# Patient Record
Sex: FEMALE | Race: WHITE | NOT HISPANIC OR LATINO | Employment: PART TIME
[De-identification: names, ages, dates, MRNs, and addresses within clinical notes are randomized per-mention and may not be internally consistent; named-entity substitution may affect disease eponyms.]

---

## 2017-06-24 ENCOUNTER — HEALTH MAINTENANCE LETTER (OUTPATIENT)
Age: 32
End: 2017-06-24

## 2017-12-14 ENCOUNTER — OFFICE VISIT (OUTPATIENT)
Dept: PEDIATRICS | Facility: CLINIC | Age: 32
End: 2017-12-14
Payer: COMMERCIAL

## 2017-12-14 VITALS
BODY MASS INDEX: 22.38 KG/M2 | OXYGEN SATURATION: 97 % | HEIGHT: 69 IN | TEMPERATURE: 97.4 F | HEART RATE: 71 BPM | DIASTOLIC BLOOD PRESSURE: 60 MMHG | SYSTOLIC BLOOD PRESSURE: 100 MMHG | WEIGHT: 151.1 LBS

## 2017-12-14 DIAGNOSIS — Z12.4 SCREENING FOR MALIGNANT NEOPLASM OF CERVIX: ICD-10-CM

## 2017-12-14 DIAGNOSIS — N94.10 DYSPAREUNIA IN FEMALE: ICD-10-CM

## 2017-12-14 DIAGNOSIS — Z00.00 ENCOUNTER FOR ROUTINE ADULT HEALTH EXAMINATION WITHOUT ABNORMAL FINDINGS: Primary | ICD-10-CM

## 2017-12-14 PROCEDURE — 99395 PREV VISIT EST AGE 18-39: CPT | Performed by: NURSE PRACTITIONER

## 2017-12-14 PROCEDURE — G0145 SCR C/V CYTO,THINLAYER,RESCR: HCPCS | Performed by: NURSE PRACTITIONER

## 2017-12-14 PROCEDURE — 87624 HPV HI-RISK TYP POOLED RSLT: CPT | Performed by: NURSE PRACTITIONER

## 2017-12-14 ASSESSMENT — PATIENT HEALTH QUESTIONNAIRE - PHQ9
5. POOR APPETITE OR OVEREATING: SEVERAL DAYS
SUM OF ALL RESPONSES TO PHQ QUESTIONS 1-9: 4

## 2017-12-14 ASSESSMENT — ANXIETY QUESTIONNAIRES
7. FEELING AFRAID AS IF SOMETHING AWFUL MIGHT HAPPEN: NOT AT ALL
2. NOT BEING ABLE TO STOP OR CONTROL WORRYING: SEVERAL DAYS
3. WORRYING TOO MUCH ABOUT DIFFERENT THINGS: SEVERAL DAYS
6. BECOMING EASILY ANNOYED OR IRRITABLE: NOT AT ALL
1. FEELING NERVOUS, ANXIOUS, OR ON EDGE: SEVERAL DAYS
GAD7 TOTAL SCORE: 4
5. BEING SO RESTLESS THAT IT IS HARD TO SIT STILL: NOT AT ALL

## 2017-12-14 NOTE — NURSING NOTE
"Chief Complaint   Patient presents with     Physical     AFE       Initial /60 (BP Location: Right arm, Patient Position: Sitting, Cuff Size: Adult Regular)  Pulse 71  Temp 97.4  F (36.3  C) (Temporal)  Ht 5' 8.75\" (1.746 m)  Wt 151 lb 1.6 oz (68.5 kg)  SpO2 97%  Breastfeeding? Yes  BMI 22.48 kg/m2 Estimated body mass index is 22.48 kg/(m^2) as calculated from the following:    Height as of this encounter: 5' 8.75\" (1.746 m).    Weight as of this encounter: 151 lb 1.6 oz (68.5 kg).  Medication Reconciliation: complete      SARAHI Keating      "

## 2017-12-14 NOTE — PATIENT INSTRUCTIONS
PLAN:   1.  Orders Placed This Encounter   Procedures     US Pelvic Complete w Transvaginal     HPV High Risk Types DNA Cervical     Pap imaged thin layer screen with HPV - recommended age 30 - 65 years (select HPV order below)       2. Patient needs to follow up in if no improvement,or sooner if worsening of symptoms or other symptoms develop.  FURTHER TESTING:       - pelvic ultrasound  Will follow up and/or notify patient of  results via My Chart to determine further need for followup  Follow up office visit in one year for annual health maintenance exam, sooner PRN.    Preventive Health Recommendations  Female Ages 26 - 39  Yearly exam:   See your health care provider every year in order to    Review health changes.     Discuss preventive care.      Review your medicines if you your doctor has prescribed any.    Until age 30: Get a Pap test every three years (more often if you have had an abnormal result).    After age 30: Talk to your doctor about whether you should have a Pap test every 3 years or have a Pap test with HPV screening every 5 years.   You do not need a Pap test if your uterus was removed (hysterectomy) and you have not had cancer.  You should be tested each year for STDs (sexually transmitted diseases), if you're at risk.   Talk to your provider about how often to have your cholesterol checked.  If you are at risk for diabetes, you should have a diabetes test (fasting glucose).  Shots: Get a flu shot each year. Get a tetanus shot every 10 years.   Nutrition:     Eat at least 5 servings of fruits and vegetables each day.    Eat whole-grain bread, whole-wheat pasta and brown rice instead of white grains and rice.    Talk to your provider about Calcium and Vitamin D.     Lifestyle    Exercise at least 150 minutes a week (30 minutes a day, 5 days of the week). This will help you control your weight and prevent disease.    Limit alcohol to one drink per day.    No smoking.     Wear sunscreen to  prevent skin cancer.    See your dentist every six months for an exam and cleaning.  It was a pleasure seeing you today at the [facility]. Thank you for allowing us to care for you today. We truly hope we provided you with the excellent service you deserve. Please let us know if there is anything else we can do for you so we can be sure you are leaving completley satisfied with your care experience.       General information about your clinic   Clinic Hours Lab Hours (Appointments are required)   Mon-Thurs: 7:30 AM - 7 PM Mon-Thurs: 7:30 AM - 7 PM   Fri: 7:30 AM - 5 PM Fri: 7:30 AM - 5 PM        After Hours Nurse Advise & Appts:  [facility] Nurse Advisors: [phone]  [facility] On Call: to make appointments anytime: [phone] On Call Physician: call [phone] and answering service will page the on call physician.        For urgent appointments, please call [phone] and ask for the triage nurse or your care team clinic nurse.  How to contact my care team:  MyChart: [email or web address]   Phone: [phone]   Fax: [phone]       [facility]:   Phone: [phone]  Hours: 8:00 AM - 6:00 PM  Medication Refills:  Call your pharmacy and they will forward the refill to us. Please allow 3 business days for your refills to be completed.       Normal or non-critical lab and imaging results will be communicated to you by MyChart, letter or phone within 7 days.  If you do not hear from us within 10 days, please call the clinic. If you have a critical or abnormal lab result, we will notify you by phone as soon as possible.       We now have PWIC (Pediatric Walk in Care)  Monday-Friday from 7:30-4. Simply walk in and be seen for your urgent needs like cough, fever, rash, diarrhea or vomiting, pink eye, UTI. No appointments needed. Ask one of the team for more information      -Your Care Team:    Dr. Lynette Cruz - Internal Medicine/Pediatrics   Dr. Sharath Ratliff - Family  Medicine  Dr. Samreen Little - Pediatrics  Dr. Peggy Roblero - Pediatrics  Gaye Branch CNP - Family Practice Nurse Practitioner

## 2017-12-14 NOTE — PROGRESS NOTES
SUBJECTIVE:   CC: Conchita Linares is an 32 year old woman who presents for preventive health visit.     Healthy Habits:    Do you get at least three servings of calcium containing foods daily (dairy, green leafy vegetables, etc.)? yes    Amount of exercise or daily activities, outside of work: 3 day(s) per week    Problems taking medications regularly not applicable    Medication side effects: No    Have you had an eye exam in the past two years? no    Do you see a dentist twice per year? yes    Do you have sleep apnea, excessive snoring or daytime drowsiness?no      Today's PHQ-2 Score:   PHQ-2 ( 1999 Pf[employer]) 12/14/2017 4/28/2015   Q1: Little interest or pleasure in doing things 0 0   Q2: Feeling down, depressed or hopeless 0 0   PHQ-2 Score 0 0       Abuse: Current or Past(Physical, Sexual or Emotional)- No  Do you feel safe in your environment - Yes  Social History   Substance Use Topics     Smoking status: Never Smoker     Smokeless tobacco: Never Used     Alcohol use Yes      Comment: rarely     If you drink alcohol do you typically have >3 drinks per day or >7 drinks per week? No                     Reviewed orders with patient.  Reviewed health maintenance and updated orders accordingly - Yes  Labs reviewed in EPIC  Patient Active Problem List   Diagnosis     Contraception     Intermittent asthma     CARDIOVASCULAR SCREENING; LDL GOAL LESS THAN 160     Mild major depression (H)     Attention deficit disorder of adult     Past Surgical History:   Procedure Laterality Date     TONSILLECTOMY      1993       Social History   Substance Use Topics     Smoking status: Never Smoker     Smokeless tobacco: Never Used     Alcohol use Yes      Comment: rarely     Family History   Problem Relation Age of Onset     Hypertension Father      CEREBROVASCULAR DISEASE Father      Congenital Anomalies Father      Depression Father      Cardiovascular Father      Cancer - colorectal Maternal Grandfather      Hypertension  Maternal Grandfather      Cancer - colorectal Paternal Grandmother      Breast Cancer Paternal Grandmother      Depression Maternal Uncle      OSTEOPOROSIS Maternal Grandmother      Asthma No family hx of      C.A.D. No family hx of      DIABETES No family hx of      Prostate Cancer No family hx of      Thyroid Disease No family hx of      Blood Disease No family hx of          Current Outpatient Prescriptions   Medication Sig Dispense Refill     Multiple Vitamin (MULTI-VITAMIN PO) Take  by mouth daily.       amphetamine-dextroamphetamine (ADDERALL) 30 MG tablet Take 1 tablet (30 mg) by mouth 2 times daily (Patient not taking: Reported on 12/14/2017) 60 tablet 0     amphetamine-dextroamphetamine (ADDERALL) 30 MG tablet Take 1 tablet (30 mg) by mouth 2 times daily (Patient not taking: Reported on 12/14/2017) 60 tablet 0     buPROPion (WELLBUTRIN SR) 150 MG 12 hr tablet Take 1 tablet once daily and increase to 1 tablet twice daily after 4 to 7 days (Patient not taking: Reported on 12/14/2017) 180 tablet 2     Allergies   Allergen Reactions     Sulfa Drugs      Rash      Amoxil [Amoxicillin] Rash         Mammogram not appropriate for this patient based on age.    Pertinent mammograms are reviewed under the imaging tab.  History of abnormal Pap smear:   YES - other categories - see link Cervical Cytology Screening Guidelines  PAP / HPV 4/10/2014 3/4/2013   PAP NIL ASC-US(A)     Last 3 Pap and HPV Results:       Reviewed and updated as needed this visit by clinical staffAllergies  Meds         Reviewed and updated as needed this visit by Provider        Past Medical History:   Diagnosis Date     ADD (attention deficit disorder)      Attention deficit disorder of adult 3/18/2013     Depression      Intermittent asthma 10/10/2012     Mild major depression (H) 10/15/2012    Seeing [facility] for ADHD and ADD testing 12/2012. - see release of information. ARIADNE Parks       Past Surgical History:   Procedure  "Laterality Date     TONSILLECTOMY      1993        ROS:  CONSTITUTIONAL:NEGATIVE for fever, chills, change in weight  INTEGUMENTARY/SKIN: NEGATIVE for changing lesion  and non-healing lesion  and POSITIVE for fatty lumps   EYES: NEGATIVE for vision changes or irritation  ENT: NEGATIVE for ear, mouth and throat problems  RESP:NEGATIVE for significant cough or SOB  BREAST: NEGATIVE for masses, tenderness or discharge  CV: NEGATIVE for chest pain, palpitations or peripheral edema  GI: NEGATIVE for nausea, abdominal pain, heartburn, or change in bowel habits   female: no unusual urinary symptoms but has  dyspareunia for several months even before the Mirena   MUSCULOSKELETAL:NEGATIVE for significant arthralgias or myalgia  NEURO: NEGATIVE for weakness, dizziness or paresthesias  ENDOCRINE: NEGATIVE for temperature intolerance, skin/hair changes  HEME/ALLERGY/IMMUNE: NEGATIVE for bleeding problems  PSYCHIATRIC: POSITIVE forconcentration difficulty as is off her Adderal due to breast feeding. Will restart after finishing her breastfeeding  and NEGATIVE forthoughts of hurting someone else and thoughts of self harm       OBJECTIVE:   /60 (BP Location: Right arm, Patient Position: Sitting, Cuff Size: Adult Regular)  Pulse 71  Temp 97.4  F (36.3  C) (Temporal)  Ht 5' 8.75\" (1.746 m)  Wt 151 lb 1.6 oz (68.5 kg)  SpO2 97%  Breastfeeding? Yes  BMI 22.48 kg/m2  EXAM:  GENERAL: healthy, alert and no distress  EYES: Eyes grossly normal to inspection, PERRL and conjunctivae and sclerae normal  HENT: ear canals and TM's normal, nose and mouth without ulcers or lesions  NECK: no adenopathy, no asymmetry, masses, or scars and thyroid normal to palpation  RESP: lungs clear to auscultation - no rales, rhonchi or wheezes  BREAST: normal without masses, tenderness or nipple discharge and no palpable axillary masses or adenopathy  CV: regular rate and rhythm, normal S1 S2, no S3 or S4, no murmur, click or rub, no peripheral " "edema and peripheral pulses strong  ABDOMEN: soft, nontender, no hepatosplenomegaly, no masses and bowel sounds normal   (female): normal female external genitalia, normal urethral meatus, vaginal mucosa pink, moist, well rugated, and normal cervix/adnexa/uterus without masses or discharge  MS: no gross musculoskeletal defects noted, no edema  SKIN: no suspicious lesions or rashes  SKIN: Typical soft, rubbery, mobile subcutaneous mass on right arm  consistent with lipoma.  1 cm.  NEURO: Normal strength and tone, mentation intact and speech normal  PSYCH: mentation appears normal, affect normal/bright  LYMPH: no cervical, supraclavicular, axillary, or inguinal adenopathy    ASSESSMENT/PLAN:   Conchtia was seen today for physical.    Diagnoses and all orders for this visit:    Encounter for routine adult health examination without abnormal findings    Screening for malignant neoplasm of cervix  -     HPV High Risk Types DNA Cervical  -     Pap imaged thin layer screen with HPV - recommended age 30 - 65 years (select HPV order below)    Dyspareunia in female  -     US Pelvic Complete w Transvaginal; Future      PLAN:    Patient needs to follow up in if no improvement,or sooner if worsening of symptoms or other symptoms develop.  FURTHER TESTING:       - pelvic ultrasound  Will follow up and/or notify patient of  results via My Chart to determine further need for followup  Follow up office visit in one year for annual health maintenance exam, sooner PRN.      COUNSELING:   Reviewed preventive health counseling, as reflected in patient instructions  Special attention given to:        Regular exercise       Healthy diet/nutrition       Contraception       Family planning         reports that she has never smoked. She has never used smokeless tobacco.    Estimated body mass index is 22.77 kg/(m^2) as calculated from the following:    Height as of 4/28/15: 5' 9\" (1.753 m).    Weight as of 4/28/15: 154 lb 3.2 oz (69.9 kg). "         Counseling Resources:  ATP IV Guidelines  Pooled Cohorts Equation Calculator  Breast Cancer Risk Calculator  FRAX Risk Assessment  ICSI Preventive Guidelines  Dietary Guidelines for Americans, 2010  USDA's MyPlate  ASA Prophylaxis  Lung CA Screening    MARIAA Richard [facility]

## 2017-12-14 NOTE — MR AVS SNAPSHOT
After Visit Summary   12/14/2017    Conchita Linares    MRN: [id number]           Patient Information     Date Of Birth          1985        Visit Information        Provider Department      12/14/2017 2:30 PM Gaye Branch APRN CNP [facility]        Today's Diagnoses     Dyspareunia in female    -  1    Encounter for routine adult health examination without abnormal findings        Screening for malignant neoplasm of cervix          Care Instructions    PLAN:   1.  Orders Placed This Encounter   Procedures     US Pelvic Complete w Transvaginal     HPV High Risk Types DNA Cervical     Pap imaged thin layer screen with HPV - recommended age 30 - 65 years (select HPV order below)       2. Patient needs to follow up in if no improvement,or sooner if worsening of symptoms or other symptoms develop.  FURTHER TESTING:       - pelvic ultrasound  Will follow up and/or notify patient of  results via My Chart to determine further need for followup  Follow up office visit in one year for annual health maintenance exam, sooner PRN.    Preventive Health Recommendations  Female Ages 26 - 39  Yearly exam:   See your health care provider every year in order to    Review health changes.     Discuss preventive care.      Review your medicines if you your doctor has prescribed any.    Until age 30: Get a Pap test every three years (more often if you have had an abnormal result).    After age 30: Talk to your doctor about whether you should have a Pap test every 3 years or have a Pap test with HPV screening every 5 years.   You do not need a Pap test if your uterus was removed (hysterectomy) and you have not had cancer.  You should be tested each year for STDs (sexually transmitted diseases), if you're at risk.   Talk to your provider about how often to have your cholesterol checked.  If you are at risk for diabetes, you should have a diabetes test (fasting glucose).  Shots: Get a flu  shot each year. Get a tetanus shot every 10 years.   Nutrition:     Eat at least 5 servings of fruits and vegetables each day.    Eat whole-grain bread, whole-wheat pasta and brown rice instead of white grains and rice.    Talk to your provider about Calcium and Vitamin D.     Lifestyle    Exercise at least 150 minutes a week (30 minutes a day, 5 days of the week). This will help you control your weight and prevent disease.    Limit alcohol to one drink per day.    No smoking.     Wear sunscreen to prevent skin cancer.    See your dentist every six months for an exam and cleaning.  It was a pleasure seeing you today at the [facility]. Thank you for allowing us to care for you today. We truly hope we provided you with the excellent service you deserve. Please let us know if there is anything else we can do for you so we can be sure you are leaving completley satisfied with your care experience.       General information about your clinic   Clinic Hours Lab Hours (Appointments are required)   Mon-Thurs: 7:30 AM - 7 PM Mon-Thurs: 7:30 AM - 7 PM   Fri: 7:30 AM - 5 PM Fri: 7:30 AM - 5 PM        After Hours Nurse Advise & Appts:  [facility] Nurse Advisors: [phone]  [facility] On Call: to make appointments anytime: [phone] On Call Physician: call [phone] and answering service will page the on call physician.        For urgent appointments, please call [phone] and ask for the triage nurse or your care team clinic nurse.  How to contact my care team:  [facility]: [email or web address]   Phone: [phone]   Fax: [phone]       [facility]:   Phone: [phone]  Hours: 8:00 AM - 6:00 PM  Medication Refills:  Call your pharmacy and they will forward the refill to us. Please allow 3 business days for your refills to be completed.       Normal or non-critical lab and imaging results will be communicated to you by MyChart, letter or phone within 7 days.  If you do not  hear from us within 10 days, please call the clinic. If you have a critical or abnormal lab result, we will notify you by phone as soon as possible.       We now have PWIC (Pediatric Walk in Care)  Monday-Friday from 7:30-4. Simply walk in and be seen for your urgent needs like cough, fever, rash, diarrhea or vomiting, pink eye, UTI. No appointments needed. Ask one of the team for more information      -Your Care Team:    Dr. yLnette Cruz - Internal Medicine/Pediatrics   Dr. Sharath Ratliff - Family Medicine  Dr. Samreen Little - Pediatrics  Dr. Peggy Roblero - Pediatrics  Gaye Branch CNP - Family Practice Nurse Practitioner                         Follow-ups after your visit        Future tests that were ordered for you today     Open Future Orders        Priority Expected Expires Ordered    US Pelvic Complete w Transvaginal Routine  12/14/2018 12/14/2017            Who to contact     If you have questions or need follow up information about today's clinic visit or your schedule please contact [facility] directly at [phone].  Normal or non-critical lab and imaging results will be communicated to you by MyChart, letter or phone within 4 business days after the clinic has received the results. If you do not hear from us within 7 days, please contact the clinic through [facility] or phone. If you have a critical or abnormal lab result, we will notify you by phone as soon as possible.  Submit refill requests through [facility] or call your pharmacy and they will forward the refill request to us. Please allow 3 business days for your refill to be completed.          Additional Information About Your Visit        [facility] [employer] Information     [facility] gives you secure access to your electronic health record. If you see a primary care provider, you can also send messages to your care team and make appointments. If you have questions, please call your primary care clinic.  If you do not have a primary care  "provider, please call [phone] and they will assist you.      [employer] is an electronic gateway that provides easy, online access to your medical records. With [employer], you can request a clinic appointment, read your test results, renew a prescription or communicate with your care team.     To access your existing account, please contact your [facility] or call [phone] for assistance.        Care EveryWhere ID     This is your Care EveryWhere ID. This could be used by other organizations to access your [facility] medical records  [id number]        Your Vitals Were     Pulse Temperature Height Pulse Oximetry Breastfeeding? BMI (Body Mass Index)    71 97.4  F (36.3  C) (Temporal) 5' 8.75\" (1.746 m) 97% Yes 22.48 kg/m2       Blood Pressure from Last 3 Encounters:   12/14/17 100/60   04/28/15 100/65   11/10/14 100/70    Weight from Last 3 Encounters:   12/14/17 151 lb 1.6 oz (68.5 kg)   04/28/15 154 lb 3.2 oz (69.9 kg)   11/10/14 153 lb (69.4 kg)              We Performed the Following     HPV High Risk Types DNA Cervical     Pap imaged thin layer screen with HPV - recommended age 30 - 65 years (select HPV order below)        Primary Care Provider Office Phone # Fax #    Gaye Sharma MARIAA Branch [facility] [phone]       [address]  [place] [address]        Equal Access to Services     TRENTON JOEL : Hadii aad ku hadasho Soomaali, waaxda luqadaha, qaybta kaalmada adeegyada, quinten mathew . So [facility] [phone].    ATENCIÓN: Si habla español, tiene a unger disposición servicios gratuitos de asistencia lingüística. Llame al [phone].    We comply with applicable federal civil rights laws and [place] laws. We do not discriminate on the basis of race, color, national origin, age, disability, sex, sexual orientation, or gender identity.            Thank you!     Thank you for choosing [facility]  for your care. " Our goal is always to provide you with excellent care. Hearing back from our patients is one way we can continue to improve our services. Please take a few minutes to complete the written survey that you may receive in the mail after your visit with us. Thank you!             Your Updated Medication List - Protect others around you: Learn how to safely use, store and throw away your medicines at www.disposemymeds.org.          This list is accurate as of: 12/14/17  3:12 PM.  Always use your most recent med list.                   Brand Name Dispense Instructions for use Diagnosis    * amphetamine-dextroamphetamine 30 MG per tablet    ADDERALL    60 tablet    Take 1 tablet (30 mg) by mouth 2 times daily    Attention deficit disorder of adult       * amphetamine-dextroamphetamine 30 MG per tablet    ADDERALL    60 tablet    Take 1 tablet (30 mg) by mouth 2 times daily    Attention deficit disorder of adult       buPROPion 150 MG 12 hr tablet    WELLBUTRIN SR    180 tablet    Take 1 tablet once daily and increase to 1 tablet twice daily after 4 to 7 days    Mild major depression (H)       MULTI-VITAMIN PO      Take  by mouth daily.        * Notice:  This list has 2 medication(s) that are the same as other medications prescribed for you. Read the directions carefully, and ask your doctor or other care provider to review them with you.

## 2017-12-15 ENCOUNTER — RADIANT APPOINTMENT (OUTPATIENT)
Dept: ULTRASOUND IMAGING | Facility: CLINIC | Age: 32
End: 2017-12-15
Attending: NURSE PRACTITIONER
Payer: COMMERCIAL

## 2017-12-15 DIAGNOSIS — N94.10 DYSPAREUNIA IN FEMALE: ICD-10-CM

## 2017-12-15 PROCEDURE — 76830 TRANSVAGINAL US NON-OB: CPT | Performed by: STUDENT IN AN ORGANIZED HEALTH CARE EDUCATION/TRAINING PROGRAM

## 2017-12-15 PROCEDURE — 76856 US EXAM PELVIC COMPLETE: CPT | Performed by: STUDENT IN AN ORGANIZED HEALTH CARE EDUCATION/TRAINING PROGRAM

## 2017-12-15 ASSESSMENT — ANXIETY QUESTIONNAIRES: GAD7 TOTAL SCORE: 4

## 2017-12-15 ASSESSMENT — ASTHMA QUESTIONNAIRES: ACT_TOTALSCORE: 25

## 2017-12-15 NOTE — PROGRESS NOTES
Juana Linares,    Attached are your test results.  Your pelvic ultrasound is normal.   Your IUD is in place  If you are still having pain with intercourse I would like you to make an appointment with your GYN    Please contact us if you have any questions.    Gaye Branch, CNP

## 2017-12-18 LAB
COPATH REPORT: NORMAL
PAP: NORMAL

## 2017-12-20 ENCOUNTER — MYC MEDICAL ADVICE (OUTPATIENT)
Dept: PEDIATRICS | Facility: CLINIC | Age: 32
End: 2017-12-20

## 2017-12-20 LAB
FINAL DIAGNOSIS: NORMAL
HPV HR 12 DNA CVX QL NAA+PROBE: NEGATIVE
HPV16 DNA SPEC QL NAA+PROBE: NEGATIVE
HPV18 DNA SPEC QL NAA+PROBE: NEGATIVE
SPECIMEN DESCRIPTION: NORMAL

## 2017-12-20 NOTE — TELEPHONE ENCOUNTER
Printed the Pelvis ultrasound results from 12/15 and the OV notes with Gaye Branch CNP from 12/14/2017 to fax # [phone] per patient request. Terra Parmar RN

## 2018-06-22 ENCOUNTER — TELEPHONE (OUTPATIENT)
Dept: PEDIATRICS | Facility: CLINIC | Age: 33
End: 2018-06-22

## 2018-06-22 ENCOUNTER — OFFICE VISIT (OUTPATIENT)
Dept: PEDIATRICS | Facility: CLINIC | Age: 33
End: 2018-06-22
Payer: COMMERCIAL

## 2018-06-22 VITALS
BODY MASS INDEX: 22.42 KG/M2 | WEIGHT: 151.4 LBS | DIASTOLIC BLOOD PRESSURE: 55 MMHG | SYSTOLIC BLOOD PRESSURE: 100 MMHG | OXYGEN SATURATION: 100 % | TEMPERATURE: 97.6 F | HEIGHT: 69 IN | HEART RATE: 72 BPM

## 2018-06-22 DIAGNOSIS — L30.9 ECZEMA, UNSPECIFIED TYPE: ICD-10-CM

## 2018-06-22 DIAGNOSIS — F98.8 ATTENTION DEFICIT DISORDER OF ADULT: Primary | ICD-10-CM

## 2018-06-22 PROCEDURE — 99214 OFFICE O/P EST MOD 30 MIN: CPT | Performed by: NURSE PRACTITIONER

## 2018-06-22 RX ORDER — DEXTROAMPHETAMINE SACCHARATE, AMPHETAMINE ASPARTATE, DEXTROAMPHETAMINE SULFATE AND AMPHETAMINE SULFATE 7.5; 7.5; 7.5; 7.5 MG/1; MG/1; MG/1; MG/1
30 TABLET ORAL 2 TIMES DAILY
Qty: 60 TABLET | Refills: 0 | Status: SHIPPED | OUTPATIENT
Start: 2018-07-22 | End: 2019-02-22

## 2018-06-22 RX ORDER — DEXTROAMPHETAMINE SACCHARATE, AMPHETAMINE ASPARTATE, DEXTROAMPHETAMINE SULFATE AND AMPHETAMINE SULFATE 7.5; 7.5; 7.5; 7.5 MG/1; MG/1; MG/1; MG/1
30 TABLET ORAL 2 TIMES DAILY
Qty: 60 TABLET | Refills: 0 | Status: SHIPPED | OUTPATIENT
Start: 2018-06-22 | End: 2019-02-22

## 2018-06-22 RX ORDER — TRIAMCINOLONE ACETONIDE 1 MG/G
CREAM TOPICAL
Qty: 30 G | Refills: 1 | Status: SHIPPED | OUTPATIENT
Start: 2018-06-22 | End: 2020-02-26

## 2018-06-22 RX ORDER — DEXTROAMPHETAMINE SACCHARATE, AMPHETAMINE ASPARTATE, DEXTROAMPHETAMINE SULFATE AND AMPHETAMINE SULFATE 7.5; 7.5; 7.5; 7.5 MG/1; MG/1; MG/1; MG/1
30 TABLET ORAL 2 TIMES DAILY
Qty: 60 TABLET | Refills: 0 | Status: SHIPPED | OUTPATIENT
Start: 2018-08-21 | End: 2019-02-22

## 2018-06-22 ASSESSMENT — ANXIETY QUESTIONNAIRES
6. BECOMING EASILY ANNOYED OR IRRITABLE: SEVERAL DAYS
2. NOT BEING ABLE TO STOP OR CONTROL WORRYING: SEVERAL DAYS
3. WORRYING TOO MUCH ABOUT DIFFERENT THINGS: SEVERAL DAYS
GAD7 TOTAL SCORE: 5
1. FEELING NERVOUS, ANXIOUS, OR ON EDGE: SEVERAL DAYS
5. BEING SO RESTLESS THAT IT IS HARD TO SIT STILL: NOT AT ALL
7. FEELING AFRAID AS IF SOMETHING AWFUL MIGHT HAPPEN: NOT AT ALL

## 2018-06-22 ASSESSMENT — PATIENT HEALTH QUESTIONNAIRE - PHQ9: 5. POOR APPETITE OR OVEREATING: SEVERAL DAYS

## 2018-06-22 NOTE — MR AVS SNAPSHOT
After Visit Summary   6/22/2018    Conchita Linares    MRN: [id number]           Patient Information     Date Of Birth          1985        Visit Information        Provider Department      6/22/2018 10:10 AM Gaey Branch APRN CNP [facility]        Today's Diagnoses     Attention deficit disorder of adult    -  1    Eczema, unspecified type          Care Instructions    PLAN:   1.   Symptomatic therapy suggested: will start on the adderall at half tablet twice a day and then can gradually increase to your usual dose  Use Dove or Aveeno lotion or soap  Use Aquaphor, lubrider, cetaphil or eucerin lotion daily to two times a day  As needed   Use the steroid  cream On the dry patches until better and then use as needed after   2.  Orders Placed This Encounter   Medications     triamcinolone (KENALOG) 0.1 % cream     Sig: Apply sparingly to affected area three times daily prn     Dispense:  30 g     Refill:  1     amphetamine-dextroamphetamine (ADDERALL) 30 MG per tablet     Sig: Take 1 tablet (30 mg) by mouth 2 times daily     Dispense:  60 tablet     Refill:  0     amphetamine-dextroamphetamine (ADDERALL) 30 MG per tablet     Sig: Take 1 tablet (30 mg) by mouth 2 times daily     Dispense:  60 tablet     Refill:  0     amphetamine-dextroamphetamine (ADDERALL) 30 MG per tablet     Sig: Take 1 tablet (30 mg) by mouth 2 times daily     Dispense:  60 tablet     Refill:  0       3. Patient needs to follow up in if no improvement,or sooner if worsening of symptoms or other symptoms develop.  Follow up in 6 months.  Schedule physical exam   It was a pleasure seeing you today at the [facility]. Thank you for allowing us to care for you today. We truly hope we provided you with the excellent service you deserve. Please let us know if there is anything else we can do for you so we can be sure you are leaving completley satisfied with your care  experience.       General information about your clinic   Clinic Hours Lab Hours (Appointments are required)   Mon-Thurs: 7:30 AM - 7 PM Mon-Thurs: 7:30 AM - 7 PM   Fri: 7:30 AM - 5 PM Fri: 7:30 AM - 5 PM        After Hours Nurse Advise & Appts:  [facility] Nurse Advisors: [phone]  [facility] On Call: to make appointments anytime: [phone] On Call Physician: call [phone] and answering service will page the on call physician.        For urgent appointments, please call [phone] and ask for the triage nurse or your care team clinic nurse.  How to contact my care team:  MyChart: [email or web address]   Phone: [phone]   Fax: [phone]       [facility]:   Phone: [phone]  Hours: 8:00 AM - 6:00 PM  Medication Refills:  Call your pharmacy and they will forward the refill to us. Please allow 3 business days for your refills to be completed.       Normal or non-critical lab and imaging results will be communicated to you by MyChart, letter or phone within 7 days.  If you do not hear from us within 10 days, please call the clinic. If you have a critical or abnormal lab result, we will notify you by phone as soon as possible.       We now have PWIC (Pediatric Walk in Care)  Monday-Friday from 7:30-4. Simply walk in and be seen for your urgent needs like cough, fever, rash, diarrhea or vomiting, pink eye, UTI. No appointments needed. Ask one of the team for more information      -Your Care Team:    Dr. Lynette Cruz - Internal Medicine/Pediatrics   Dr. Sharath Ratliff - Family Medicine  Dr. Samreen Little - Pediatrics  Dr. Peggy Roblero - Pediatrics  Gaye Branch CNP - Family Practice Nurse Practitioner                         Follow-ups after your visit        Who to contact     If you have questions or need follow up information about today's clinic visit or your schedule please contact [facility] directly at [phone].  Normal or non-critical lab and imaging results will  "be communicated to you by MyChart, letter or phone within 4 business days after the clinic has received the results. If you do not hear from us within 7 days, please contact the clinic through [employer] or phone. If you have a critical or abnormal lab result, we will notify you by phone as soon as possible.  Submit refill requests through [employer] or call your pharmacy and they will forward the refill request to us. Please allow 3 business days for your refill to be completed.          Additional Information About Your Visit        [employer] Information     [employer] gives you secure access to your electronic health record. If you see a primary care provider, you can also send messages to your care team and make appointments. If you have questions, please call your primary care clinic.  If you do not have a primary care provider, please call [phone] and they will assist you.      [employer] is an electronic gateway that provides easy, online access to your medical records. With [employer], you can request a clinic appointment, read your test results, renew a prescription or communicate with your care team.     To access your existing account, please contact your [facility] or call [phone] for assistance.        Care EveryWhere ID     This is your Care EveryWhere ID. This could be used by other organizations to access your [facility] medical records  [id number]        Your Vitals Were     Pulse Temperature Height Pulse Oximetry Breastfeeding? BMI (Body Mass Index)    72 97.6  F (36.4  C) (Temporal) 5' 8.75\" (1.746 m) 100% No 22.52 kg/m2       Blood Pressure from Last 3 Encounters:   06/22/18 100/55   12/14/17 100/60   04/28/15 100/65    Weight from Last 3 Encounters:   06/22/18 151 lb 6.4 oz (68.7 kg)   12/14/17 151 lb 1.6 oz (68.5 kg)   04/28/15 154 lb 3.2 oz (69.9 kg)              Today, you had the following     No orders found for display         Today's Medication Changes        "   These changes are accurate as of 6/22/18 10:45 AM.  If you have any questions, ask your nurse or doctor.               Start taking these medicines.        Dose/Directions    triamcinolone 0.1 % cream   Commonly known as:  KENALOG   Used for:  Eczema, unspecified type   Started by:  Gaye Branch APRN CNP        Apply sparingly to affected area three times daily prn   Quantity:  30 g   Refills:  1         These medicines have changed or have updated prescriptions.        Dose/Directions    * amphetamine-dextroamphetamine 30 MG per tablet   Commonly known as:  ADDERALL   This may have changed:    - Another medication with the same name was added. Make sure you understand how and when to take each.  - Another medication with the same name was changed. Make sure you understand how and when to take each.   Used for:  Attention deficit disorder of adult   Changed by:  Gaye Branch APRN CNP        Dose:  30 mg   Take 1 tablet (30 mg) by mouth 2 times daily   Quantity:  60 tablet   Refills:  0       * amphetamine-dextroamphetamine 30 MG per tablet   Commonly known as:  ADDERALL   This may have changed:  These instructions start on 7/22/2018. If you are unsure what to do until then, ask your doctor or other care provider.   Used for:  Attention deficit disorder of adult   Changed by:  Gaye Branch APRN CNP        Dose:  30 mg   Start taking on:  7/22/2018   Take 1 tablet (30 mg) by mouth 2 times daily   Quantity:  60 tablet   Refills:  0       * amphetamine-dextroamphetamine 30 MG per tablet   Commonly known as:  ADDERALL   This may have changed:  You were already taking a medication with the same name, and this prescription was added. Make sure you understand how and when to take each.   Used for:  Attention deficit disorder of adult   Changed by:  Gaye Branch APRN CNP        Dose:  30 mg   Start taking on:  8/21/2018   Take 1 tablet (30 mg) by mouth 2 times daily   Quantity:  60  tablet   Refills:  0       * Notice:  This list has 3 medication(s) that are the same as other medications prescribed for you. Read the directions carefully, and ask your doctor or other care provider to review them with you.         Where to get your medicines      These medications were sent to [facility] [place] - [place], [place] - [address], [address]  [address], [address], [facility] [address]     Phone:  [phone]     triamcinolone 0.1 % cream         Some of these will need a paper prescription and others can be bought over the counter.  Ask your nurse if you have questions.     Bring a paper prescription for each of these medications     amphetamine-dextroamphetamine 30 MG per tablet    amphetamine-dextroamphetamine 30 MG per tablet    amphetamine-dextroamphetamine 30 MG per tablet                Primary Care Provider Office Phone # Fax #    Gaye Britoanthony Branch, MARIAA -[phone]       [address]  [place] [address]        Equal Access to Services     KAROL JOEL : Hadii aad ku hadasho Soomaali, waaxda luqadaha, qaybta kaalmada adeegyada, waxay idiin hayaan fox mathew . So [facility] [phone].    ATENCIÓN: Si habla español, tiene a unger disposición servicios gratuitos de asistencia lingüística. [facility] [phone].    We comply with applicable federal civil rights laws and [place] laws. We do not discriminate on the basis of race, color, national origin, age, disability, sex, sexual orientation, or gender identity.            Thank you!     Thank you for choosing [facility]  for your care. Our goal is always to provide you with excellent care. Hearing back from our patients is one way we can continue to improve our services. Please take a few minutes to complete the written survey that you may receive in the mail after your visit with us. Thank you!             Your Updated Medication List - Protect others around  you: Learn how to safely use, store and throw away your medicines at www.disposemymeds.org.          This list is accurate as of 6/22/18 10:45 AM.  Always use your most recent med list.                   Brand Name Dispense Instructions for use Diagnosis    * amphetamine-dextroamphetamine 30 MG per tablet    ADDERALL    60 tablet    Take 1 tablet (30 mg) by mouth 2 times daily    Attention deficit disorder of adult       * amphetamine-dextroamphetamine 30 MG per tablet   Start taking on:  7/22/2018    ADDERALL    60 tablet    Take 1 tablet (30 mg) by mouth 2 times daily    Attention deficit disorder of adult       * amphetamine-dextroamphetamine 30 MG per tablet   Start taking on:  8/21/2018    ADDERALL    60 tablet    Take 1 tablet (30 mg) by mouth 2 times daily    Attention deficit disorder of adult       MULTI-VITAMIN PO      Take  by mouth daily.        triamcinolone 0.1 % cream    KENALOG    30 g    Apply sparingly to affected area three times daily prn    Eczema, unspecified type       * Notice:  This list has 3 medication(s) that are the same as other medications prescribed for you. Read the directions carefully, and ask your doctor or other care provider to review them with you.

## 2018-06-22 NOTE — TELEPHONE ENCOUNTER
Central Prior Authorization Team   Phone: [phone]    PA Initiation    Medication: amphetamine-dextroamphetamine (ADDERALL) 30 MG per tablet-PA Initiated  Insurance Company: [employer] - Phone [phone] Fax [phone]  Pharmacy Filling the Rx: [facility] [place], [place] - [address]  Filling Pharmacy Phone: [phone]  Filling Pharmacy Fax: [phone]  Start Date: 6/22/2018

## 2018-06-22 NOTE — PATIENT INSTRUCTIONS
PLAN:   1.   Symptomatic therapy suggested: will start on the adderall at half tablet twice a day and then can gradually increase to your usual dose  Use Dove or Aveeno lotion or soap  Use Aquaphor, lubrider, cetaphil or eucerin lotion daily to two times a day  As needed   Use the steroid  cream On the dry patches until better and then use as needed after   2.  Orders Placed This Encounter   Medications     triamcinolone (KENALOG) 0.1 % cream     Sig: Apply sparingly to affected area three times daily prn     Dispense:  30 g     Refill:  1     amphetamine-dextroamphetamine (ADDERALL) 30 MG per tablet     Sig: Take 1 tablet (30 mg) by mouth 2 times daily     Dispense:  60 tablet     Refill:  0     amphetamine-dextroamphetamine (ADDERALL) 30 MG per tablet     Sig: Take 1 tablet (30 mg) by mouth 2 times daily     Dispense:  60 tablet     Refill:  0     amphetamine-dextroamphetamine (ADDERALL) 30 MG per tablet     Sig: Take 1 tablet (30 mg) by mouth 2 times daily     Dispense:  60 tablet     Refill:  0       3. Patient needs to follow up in if no improvement,or sooner if worsening of symptoms or other symptoms develop.  Follow up in 6 months.  Schedule physical exam   It was a pleasure seeing you today at the [facility]. Thank you for allowing us to care for you today. We truly hope we provided you with the excellent service you deserve. Please let us know if there is anything else we can do for you so we can be sure you are leaving completley satisfied with your care experience.       General information about your clinic   Clinic Hours Lab Hours (Appointments are required)   Mon-Thurs: 7:30 AM - 7 PM Mon-Thurs: 7:30 AM - 7 PM   Fri: 7:30 AM - 5 PM Fri: 7:30 AM - 5 PM        After Hours Nurse Advise & Appts:  [facility] Nurse Advisors: [phone]  [facility]asha On Call: to make appointments anytime: [phone] On Call Physician: call [phone] and answering service will page the on call  physician.        For urgent appointments, please call [phone] and ask for the triage nurse or your care team clinic nurse.  How to contact my care team:  [facility]: [email or web address]   Phone: [phone]   Fax: [phone]       [facility]:   Phone: [phone]  Hours: 8:00 AM - 6:00 PM  Medication Refills:  Call your pharmacy and they will forward the refill to us. Please allow 3 business days for your refills to be completed.       Normal or non-critical lab and imaging results will be communicated to you by MyChart, letter or phone within 7 days.  If you do not hear from us within 10 days, please call the clinic. If you have a critical or abnormal lab result, we will notify you by phone as soon as possible.       We now have PWIC (Pediatric Walk in Care)  Monday-Friday from 7:30-4. Simply walk in and be seen for your urgent needs like cough, fever, rash, diarrhea or vomiting, pink eye, UTI. No appointments needed. Ask one of the team for more information      -Your Care Team:    Dr. Lynette Cruz - Internal Medicine/Pediatrics   Dr. Sharath Ratliff - Family Medicine  Dr. Samreen Little - Pediatrics  Dr. Peggy Roblero - Pediatrics  Gaye Branch CNP - Family Practice Nurse Practitioner

## 2018-06-22 NOTE — NURSING NOTE
"Chief Complaint   Patient presents with     A.D.H.D     restart medication     Derm Problem     possible ringworm right thight and left leg noticed 6 months ago and has been using antifungal cream       Initial /55 (BP Location: Right arm, Patient Position: Sitting, Cuff Size: Adult Regular)  Pulse 72  Temp 97.6  F (36.4  C) (Temporal)  Ht 5' 8.75\" (1.746 m)  Wt 151 lb 6.4 oz (68.7 kg)  SpO2 100%  Breastfeeding? No  BMI 22.52 kg/m2 Estimated body mass index is 22.52 kg/(m^2) as calculated from the following:    Height as of this encounter: 5' 8.75\" (1.746 m).    Weight as of this encounter: 151 lb 6.4 oz (68.7 kg).  Medication Reconciliation: complete      SARAHI Keating      "

## 2018-06-22 NOTE — PROGRESS NOTES
SUBJECTIVE:   Conchita Linares is a 32 year old female who presents to clinic today for the following health issues:    Medication Followup of adderall-patient would like to restart medication. Patient stopped due to breastfeeding.    Taking Medication as prescribed: not applicable    Side Effects:  None    Medication Helping Symptoms:  yes   Was on it in the past and did well on the adderall and would like to restart it now that is not having children    Rash  Onset: 6 months    Description:   Location: right thigh, left leg and upper thigh  Character: round, raised, red  Itching (Pruritis): YES    Progression of Symptoms:  waxing and waning    Accompanying Signs & Symptoms:  Fever: no   Body aches or joint pain: no   Sore throat symptoms: no   Recent cold symptoms: no     History:   Previous similar rash: no     Precipitating factors:   Exposure to similar rash: no   New exposures: None   Recent travel: no     Alleviating factors:  none    Therapies Tried and outcome: antifungal cream      Problem list and histories reviewed & adjusted, as indicated.  Additional history: as documented    Patient Active Problem List   Diagnosis     Contraception     Intermittent asthma     CARDIOVASCULAR SCREENING; LDL GOAL LESS THAN 160     Mild major depression (H)     Attention deficit disorder of adult     Past Surgical History:   Procedure Laterality Date     TONSILLECTOMY      1993       Social History   Substance Use Topics     Smoking status: Never Smoker     Smokeless tobacco: Never Used     Alcohol use Yes      Comment: rarely     Family History   Problem Relation Age of Onset     Hypertension Father      Cerebrovascular Disease Father      Congenital Anomalies Father      Depression Father      Cardiovascular Father      Cancer - colorectal Maternal Grandfather      Hypertension Maternal Grandfather      Cancer - colorectal Paternal Grandmother      Breast Cancer Paternal Grandmother      Depression Maternal Uncle   "    Osteoperosis Maternal Grandmother      Asthma No family hx of      C.A.D. No family hx of      Diabetes No family hx of      Prostate Cancer No family hx of      Thyroid Disease No family hx of      Blood Disease No family hx of          Current Outpatient Prescriptions   Medication Sig Dispense Refill     Multiple Vitamin (MULTI-VITAMIN PO) Take  by mouth daily.       amphetamine-dextroamphetamine (ADDERALL) 30 MG tablet Take 1 tablet (30 mg) by mouth 2 times daily (Patient not taking: Reported on 12/14/2017) 60 tablet 0     amphetamine-dextroamphetamine (ADDERALL) 30 MG tablet Take 1 tablet (30 mg) by mouth 2 times daily (Patient not taking: Reported on 12/14/2017) 60 tablet 0     buPROPion (WELLBUTRIN SR) 150 MG 12 hr tablet Take 1 tablet once daily and increase to 1 tablet twice daily after 4 to 7 days (Patient not taking: Reported on 12/14/2017) 180 tablet 2     Allergies   Allergen Reactions     Sulfa Drugs      Rash      Amoxil [Amoxicillin] Rash     BP Readings from Last 3 Encounters:   06/22/18 100/55   12/14/17 100/60   04/28/15 100/65    Wt Readings from Last 3 Encounters:   06/22/18 151 lb 6.4 oz (68.7 kg)   12/14/17 151 lb 1.6 oz (68.5 kg)   04/28/15 154 lb 3.2 oz (69.9 kg)                  Labs reviewed in EPIC    Reviewed and updated as needed this visit by clinical staff  Tobacco  Allergies  Meds  Med Hx  Surg Hx  Fam Hx  Soc Hx      Reviewed and updated as needed this visit by Provider         ROS:  Constitutional, HEENT, cardiovascular, pulmonary, gi and gu systems are negative, except as otherwise noted.    OBJECTIVE:     /55 (BP Location: Right arm, Patient Position: Sitting, Cuff Size: Adult Regular)  Pulse 72  Temp 97.6  F (36.4  C) (Temporal)  Ht 5' 8.75\" (1.746 m)  Wt 151 lb 6.4 oz (68.7 kg)  SpO2 100%  Breastfeeding? No  BMI 22.52 kg/m2  Body mass index is 22.52 kg/(m^2).  GENERAL APPEARANCE: alert, active and no distress  NECK: no adenopathy  RESP: lungs clear to " auscultation - no rales, rhonchi or wheezes  CV: regular rates and rhythm and no murmur, click or rub  MS: extremities normal- no gross deformities noted  SKIN: negatives: color normal, texture normal, mobility and turgor normal, positives: Examination of the rash reveals: Eczema:scattered dry, slightly raised, red patches with mild flaking.  NEURO: Normal strength and tone, mentation intact and speech normal  PSYCH: mentation appears normal and affect normal/bright  MENTAL STATUS EXAM:  Appearance/Behavior: No apparent distress, Casually groomed and Dressed appropriately for weather  Speech: Normal  Mood/Affect: normal affect  Insight: Adequate    Diagnostic Test Results:  none     ASSESSMENT/PLAN:     Conchita was seen today for a.d.h.d and derm problem.    Diagnoses and all orders for this visit:    Attention deficit disorder of adult  -     amphetamine-dextroamphetamine (ADDERALL) 30 MG per tablet; Take 1 tablet (30 mg) by mouth 2 times daily  -     amphetamine-dextroamphetamine (ADDERALL) 30 MG per tablet; Take 1 tablet (30 mg) by mouth 2 times daily  -     amphetamine-dextroamphetamine (ADDERALL) 30 MG per tablet; Take 1 tablet (30 mg) by mouth 2 times daily    Eczema, unspecified type  -     triamcinolone (KENALOG) 0.1 % cream; Apply sparingly to affected area three times daily prn    PLAN:   1.   Symptomatic therapy suggested: will start on the adderall at half tablet twice a day and then can gradually increase to your usual dose  Use Dove or Aveeno lotion or soap  Use Aquaphor, lubrider, cetaphil or eucerin lotion daily to two times a day  As needed   Use the steroid  cream On the dry patches until better and then use as needed after   2.  Orders Placed This Encounter   Medications     triamcinolone (KENALOG) 0.1 % cream     Sig: Apply sparingly to affected area three times daily prn     Dispense:  30 g     Refill:  1     amphetamine-dextroamphetamine (ADDERALL) 30 MG per tablet     Sig: Take 1 tablet (30  mg) by mouth 2 times daily     Dispense:  60 tablet     Refill:  0     amphetamine-dextroamphetamine (ADDERALL) 30 MG per tablet     Sig: Take 1 tablet (30 mg) by mouth 2 times daily     Dispense:  60 tablet     Refill:  0     amphetamine-dextroamphetamine (ADDERALL) 30 MG per tablet     Sig: Take 1 tablet (30 mg) by mouth 2 times daily     Dispense:  60 tablet     Refill:  0       3. Patient needs to follow up in if no improvement,or sooner if worsening of symptoms or other symptoms develop.  Follow up in 6 months.  Schedule physical exam  See Patient Instructions    MARIAA Richard CNP  [facility]

## 2018-06-22 NOTE — TELEPHONE ENCOUNTER
Hello,    Insurance requires PA for adderall,  Please contact [facility] at [phone], id # [id number].  Please let us know when approved.      Thank You,  Sangeetha Machado  Pharmacy Technician  [facility]  [phone]

## 2018-06-23 ASSESSMENT — PATIENT HEALTH QUESTIONNAIRE - PHQ9: SUM OF ALL RESPONSES TO PHQ QUESTIONS 1-9: 0

## 2018-06-23 ASSESSMENT — ANXIETY QUESTIONNAIRES: GAD7 TOTAL SCORE: 5

## 2018-06-23 ASSESSMENT — ASTHMA QUESTIONNAIRES: ACT_TOTALSCORE: 21

## 2018-06-25 NOTE — TELEPHONE ENCOUNTER
Prior Authorization Approval    Authorization Effective Date: 6/25/2018  Authorization Expiration Date: 6/25/2019  Medication: amphetamine-dextroamphetamine (ADDERALL) 30 MG per tablet-APPROVED  Approved Dose/Quantity:   Reference #: [id number]   Insurance Company: [employer] - Phone [phone] Fax [phone]  Expected CoPay:       CoPay Card Available:      Foundation Assistance Needed:    Which Pharmacy is filling the prescription (Not needed for infusion/clinic administered): [facility] [place] - [place], [place] - [address]  Pharmacy Notified: Yes  Patient Notified: No    Pharmacy will rebill claim from 6/22/18 as patient paid out of pocket.

## 2018-09-21 DIAGNOSIS — F98.8 ATTENTION DEFICIT DISORDER OF ADULT: ICD-10-CM

## 2018-09-21 RX ORDER — DEXTROAMPHETAMINE SACCHARATE, AMPHETAMINE ASPARTATE, DEXTROAMPHETAMINE SULFATE AND AMPHETAMINE SULFATE 7.5; 7.5; 7.5; 7.5 MG/1; MG/1; MG/1; MG/1
30 TABLET ORAL 2 TIMES DAILY
Qty: 60 TABLET | Refills: 0 | Status: SHIPPED | OUTPATIENT
Start: 2018-11-20 | End: 2018-12-27

## 2018-09-21 RX ORDER — DEXTROAMPHETAMINE SACCHARATE, AMPHETAMINE ASPARTATE, DEXTROAMPHETAMINE SULFATE AND AMPHETAMINE SULFATE 7.5; 7.5; 7.5; 7.5 MG/1; MG/1; MG/1; MG/1
30 TABLET ORAL 2 TIMES DAILY
Qty: 60 TABLET | Refills: 0 | Status: CANCELLED | OUTPATIENT
Start: 2018-09-21

## 2018-09-21 RX ORDER — DEXTROAMPHETAMINE SACCHARATE, AMPHETAMINE ASPARTATE, DEXTROAMPHETAMINE SULFATE AND AMPHETAMINE SULFATE 7.5; 7.5; 7.5; 7.5 MG/1; MG/1; MG/1; MG/1
30 TABLET ORAL 2 TIMES DAILY
Qty: 60 TABLET | Refills: 0 | Status: SHIPPED | OUTPATIENT
Start: 2018-09-21 | End: 2019-02-22

## 2018-09-21 RX ORDER — DEXTROAMPHETAMINE SACCHARATE, AMPHETAMINE ASPARTATE, DEXTROAMPHETAMINE SULFATE AND AMPHETAMINE SULFATE 7.5; 7.5; 7.5; 7.5 MG/1; MG/1; MG/1; MG/1
30 TABLET ORAL 2 TIMES DAILY
Qty: 60 TABLET | Refills: 0 | Status: SHIPPED | OUTPATIENT
Start: 2018-10-21 | End: 2019-02-22

## 2018-09-21 NOTE — TELEPHONE ENCOUNTER
Please inform patient, refill/prescription approved and when prescription is ready to be picked up at .   Please ask provider there to sign off scripts

## 2018-09-21 NOTE — TELEPHONE ENCOUNTER
Hello,  last fill date:06-  Last quantity:60    Thank You,  Sangeetha Machado  Pharmacy Technician  [facility]  [phone]

## 2018-12-27 ENCOUNTER — TELEPHONE (OUTPATIENT)
Dept: PEDIATRICS | Facility: CLINIC | Age: 33
End: 2018-12-27

## 2018-12-27 DIAGNOSIS — F98.8 ATTENTION DEFICIT DISORDER OF ADULT: ICD-10-CM

## 2018-12-27 RX ORDER — DEXTROAMPHETAMINE SACCHARATE, AMPHETAMINE ASPARTATE, DEXTROAMPHETAMINE SULFATE AND AMPHETAMINE SULFATE 7.5; 7.5; 7.5; 7.5 MG/1; MG/1; MG/1; MG/1
30 TABLET ORAL 2 TIMES DAILY
Qty: 60 TABLET | Refills: 0 | Status: SHIPPED | OUTPATIENT
Start: 2018-12-27 | End: 2019-01-22

## 2018-12-27 NOTE — TELEPHONE ENCOUNTER
Hello,  last fill date:11-  Last quantity:60  For adderall 30mg tablets - 1 tablet twice daily    Thank You,  Sangeetha Machado  Pharmacy Technician  [facility]  [phone]

## 2018-12-27 NOTE — TELEPHONE ENCOUNTER
amphetamine-dextroamphetamine (ADDERALL) 30 MG per tablet      Last Written Prescription Date:  11/20/18  Last Fill Quantity: 60,   # refills: 0  Last Office Visit: 06/22/18  Future Office visit:       Routing refill request to provider for review/approval because:  Drug not on the [facility], P or [facility] refill protocol or controlled substance

## 2018-12-27 NOTE — TELEPHONE ENCOUNTER
Please inform patient, refill/prescriptioni approved. Please drop off prescription at pharmacy.   Will be due for appointment before next refill

## 2018-12-27 NOTE — TELEPHONE ENCOUNTER
Pending Prescriptions:                       Disp   Refills    amphetamine-dextroamphetamine (ADDERALL) *60 tab*0            Sig: Take 1 tablet (30 mg) by mouth 2 times daily      Last Written Prescription Date:  11/20/18  Last Fill Quantity: 60,  # refills: 0   Last office visit: 6/22/2018 with prescribing provider:  Gaye Branch NP, APRN CNP   Future Office Visit:      Routing refill request to provider for review/approval because:  Drug not on the [facility], [facility] or [facility] refill protocol or controlled substance

## 2019-01-22 DIAGNOSIS — F98.8 ATTENTION DEFICIT DISORDER OF ADULT: ICD-10-CM

## 2019-01-22 RX ORDER — DEXTROAMPHETAMINE SACCHARATE, AMPHETAMINE ASPARTATE, DEXTROAMPHETAMINE SULFATE AND AMPHETAMINE SULFATE 7.5; 7.5; 7.5; 7.5 MG/1; MG/1; MG/1; MG/1
30 TABLET ORAL 2 TIMES DAILY
Qty: 60 TABLET | Refills: 0 | Status: SHIPPED | OUTPATIENT
Start: 2019-01-25 | End: 2019-06-03

## 2019-01-22 NOTE — TELEPHONE ENCOUNTER
amphetamine-dextroamphetamine (ADDERALL) 30 MG tablet  Last Written Prescription Date:  12/27/18  Last Fill Quantity: 60,  # refills: 0   Last office visit: 6/22/2018 with prescribing provider:   Future Office Visit:      Routing refill request to provider for review/approval because:  Drug not on the refill protocol     Kristy Tavarez RN   [facility]

## 2019-01-22 NOTE — TELEPHONE ENCOUNTER
Hello,  last fill date:12-  Last quantity:60    Thank You,  Sangeetha Machado  Pharmacy Technician  [facility]  [phone]

## 2019-01-22 NOTE — TELEPHONE ENCOUNTER
Please inform patient, refill/prescriptioni approved. Please drop off prescription at pharmacy.   Please let patient know is due for follow up appointment before next refill

## 2019-02-22 ENCOUNTER — OFFICE VISIT (OUTPATIENT)
Dept: PEDIATRICS | Facility: CLINIC | Age: 34
End: 2019-02-22
Payer: COMMERCIAL

## 2019-02-22 VITALS
TEMPERATURE: 97.4 F | SYSTOLIC BLOOD PRESSURE: 111 MMHG | BODY MASS INDEX: 20.25 KG/M2 | HEART RATE: 85 BPM | DIASTOLIC BLOOD PRESSURE: 60 MMHG | HEIGHT: 69 IN | WEIGHT: 136.7 LBS | OXYGEN SATURATION: 100 %

## 2019-02-22 DIAGNOSIS — F98.8 ATTENTION DEFICIT DISORDER OF ADULT: ICD-10-CM

## 2019-02-22 DIAGNOSIS — D22.9 NUMEROUS MOLES: ICD-10-CM

## 2019-02-22 DIAGNOSIS — Z00.00 ENCOUNTER FOR ROUTINE ADULT HEALTH EXAMINATION WITHOUT ABNORMAL FINDINGS: Primary | ICD-10-CM

## 2019-02-22 DIAGNOSIS — Z13.6 CARDIOVASCULAR SCREENING; LDL GOAL LESS THAN 160: ICD-10-CM

## 2019-02-22 DIAGNOSIS — Z13.29 SCREENING FOR THYROID DISORDER: ICD-10-CM

## 2019-02-22 DIAGNOSIS — Z13.1 SCREENING FOR DIABETES MELLITUS (DM): ICD-10-CM

## 2019-02-22 LAB
T4 FREE SERPL-MCNC: 0.91 NG/DL (ref 0.76–1.46)
TSH SERPL DL<=0.005 MIU/L-ACNC: 1.51 MU/L (ref 0.4–4)

## 2019-02-22 PROCEDURE — 36415 COLL VENOUS BLD VENIPUNCTURE: CPT | Performed by: NURSE PRACTITIONER

## 2019-02-22 PROCEDURE — 84439 ASSAY OF FREE THYROXINE: CPT | Performed by: NURSE PRACTITIONER

## 2019-02-22 PROCEDURE — 84443 ASSAY THYROID STIM HORMONE: CPT | Performed by: NURSE PRACTITIONER

## 2019-02-22 PROCEDURE — 99395 PREV VISIT EST AGE 18-39: CPT | Performed by: NURSE PRACTITIONER

## 2019-02-22 RX ORDER — DEXTROAMPHETAMINE SACCHARATE, AMPHETAMINE ASPARTATE, DEXTROAMPHETAMINE SULFATE AND AMPHETAMINE SULFATE 7.5; 7.5; 7.5; 7.5 MG/1; MG/1; MG/1; MG/1
30 TABLET ORAL 2 TIMES DAILY
Qty: 60 TABLET | Refills: 0 | Status: SHIPPED | OUTPATIENT
Start: 2019-03-24 | End: 2019-06-03

## 2019-02-22 RX ORDER — DEXTROAMPHETAMINE SACCHARATE, AMPHETAMINE ASPARTATE, DEXTROAMPHETAMINE SULFATE AND AMPHETAMINE SULFATE 7.5; 7.5; 7.5; 7.5 MG/1; MG/1; MG/1; MG/1
30 TABLET ORAL 2 TIMES DAILY
Qty: 60 TABLET | Refills: 0 | Status: CANCELLED | OUTPATIENT
Start: 2019-02-22

## 2019-02-22 RX ORDER — DEXTROAMPHETAMINE SACCHARATE, AMPHETAMINE ASPARTATE, DEXTROAMPHETAMINE SULFATE AND AMPHETAMINE SULFATE 7.5; 7.5; 7.5; 7.5 MG/1; MG/1; MG/1; MG/1
30 TABLET ORAL 2 TIMES DAILY
Qty: 60 TABLET | Refills: 0 | Status: SHIPPED | OUTPATIENT
Start: 2019-04-23 | End: 2019-06-03

## 2019-02-22 RX ORDER — DEXTROAMPHETAMINE SACCHARATE, AMPHETAMINE ASPARTATE, DEXTROAMPHETAMINE SULFATE AND AMPHETAMINE SULFATE 7.5; 7.5; 7.5; 7.5 MG/1; MG/1; MG/1; MG/1
30 TABLET ORAL 2 TIMES DAILY
Qty: 60 TABLET | Refills: 0 | Status: SHIPPED | OUTPATIENT
Start: 2019-02-22 | End: 2019-06-03

## 2019-02-22 ASSESSMENT — ANXIETY QUESTIONNAIRES
2. NOT BEING ABLE TO STOP OR CONTROL WORRYING: MORE THAN HALF THE DAYS
6. BECOMING EASILY ANNOYED OR IRRITABLE: NEARLY EVERY DAY
3. WORRYING TOO MUCH ABOUT DIFFERENT THINGS: NEARLY EVERY DAY
5. BEING SO RESTLESS THAT IT IS HARD TO SIT STILL: NOT AT ALL
7. FEELING AFRAID AS IF SOMETHING AWFUL MIGHT HAPPEN: NOT AT ALL
1. FEELING NERVOUS, ANXIOUS, OR ON EDGE: NEARLY EVERY DAY
GAD7 TOTAL SCORE: 14

## 2019-02-22 ASSESSMENT — PATIENT HEALTH QUESTIONNAIRE - PHQ9
5. POOR APPETITE OR OVEREATING: NEARLY EVERY DAY
SUM OF ALL RESPONSES TO PHQ QUESTIONS 1-9: 13

## 2019-02-22 ASSESSMENT — MIFFLIN-ST. JEOR: SCORE: 1380.48

## 2019-02-22 NOTE — NURSING NOTE
"Chief Complaint   Patient presents with     Physical     AFE       Initial /60 (BP Location: Right arm, Patient Position: Sitting, Cuff Size: Adult Regular)   Pulse 85   Temp 97.4  F (36.3  C) (Oral)   Ht 1.746 m (5' 8.75\")   Wt 62 kg (136 lb 11.2 oz)   SpO2 100%   Breastfeeding? No   BMI 20.33 kg/m   Estimated body mass index is 20.33 kg/m  as calculated from the following:    Height as of this encounter: 1.746 m (5' 8.75\").    Weight as of this encounter: 62 kg (136 lb 11.2 oz).  Medication Reconciliation: complete      SARAHI Keating      "

## 2019-02-22 NOTE — PATIENT INSTRUCTIONS
PLAN:   1.   Symptomatic therapy suggested: consider Wellbutrin for seasonal affective.  2.  Orders Placed This Encounter   Medications     amphetamine-dextroamphetamine (ADDERALL) 30 MG tablet     Sig: Take 1 tablet (30 mg) by mouth 2 times daily     Dispense:  60 tablet     Refill:  0     amphetamine-dextroamphetamine (ADDERALL) 30 MG tablet     Sig: Take 1 tablet (30 mg) by mouth 2 times daily     Dispense:  60 tablet     Refill:  0     amphetamine-dextroamphetamine (ADDERALL) 30 MG tablet     Sig: Take 1 tablet (30 mg) by mouth 2 times daily     Dispense:  60 tablet     Refill:  0     Orders Placed This Encounter   Procedures     TSH     T4 free     DERMATOLOGY REFERRAL       3. Patient needs to follow up in if no improvement,or sooner if worsening of symptoms or other symptoms develop.  Will follow up and/or notify patient of  results via My Chart to determine further need for followup  Follow up in 6 months.  Follow up office visit in one year for annual health maintenance exam, sooner PRN.    Preventive Health Recommendations  Female Ages 26 - 39  Yearly exam:   See your health care provider every year in order to    Review health changes.     Discuss preventive care.      Review your medicines if you your doctor has prescribed any.    Until age 30: Get a Pap test every three years (more often if you have had an abnormal result).    After age 30: Talk to your doctor about whether you should have a Pap test every 3 years or have a Pap test with HPV screening every 5 years.   You do not need a Pap test if your uterus was removed (hysterectomy) and you have not had cancer.  You should be tested each year for STDs (sexually transmitted diseases), if you're at risk.   Talk to your provider about how often to have your cholesterol checked.  If you are at risk for diabetes, you should have a diabetes test (fasting glucose).  Shots: Get a flu shot each year. Get a tetanus shot every 10 years.   Nutrition:     Eat at  least 5 servings of fruits and vegetables each day.    Eat whole-grain bread, whole-wheat pasta and brown rice instead of white grains and rice.    Get adequate Calcium and Vitamin D.     Lifestyle    Exercise at least 150 minutes a week (30 minutes a day, 5 days of the week). This will help you control your weight and prevent disease.    Limit alcohol to one drink per day.    No smoking.     Wear sunscreen to prevent skin cancer.    See your dentist every six months for an exam and cleaning.

## 2019-02-22 NOTE — PROGRESS NOTES
SUBJECTIVE:   CC: Conchita Linares is an 33 year old woman who presents for preventive health visit.     Healthy Habits:    Do you get at least three servings of calcium containing foods daily (dairy, green leafy vegetables, etc.)? no, taking calcium and/or vitamin D supplement: yes - vitamin d    Amount of exercise or daily activities, outside of work: active at work    Problems taking medications regularly No    Medication side effects: Yes muscle tightness, headaches    Have you had an eye exam in the past two years? no    Do you see a dentist twice per year? yes    Do you have sleep apnea, excessive snoring or daytime drowsiness?no      Medication Followup of adderall     Taking Medication as prescribed: yes    Side Effects:  Headaches, muscle tightness     Medication Helping Symptoms:  yes     Is looking at the Lai Protocol and has appointment in April at [facility] with integrative medicine   Would like to do this instead of medications in the future  Has been on Adderal on and off for 3 years   When to psychiatrist after college and was determined has ADHD and had been prescribed Adderal at that time  Has some muscle tension with it but has been workable for her for the benefit at this point but would like to explore other options with Lai Protocol   Will perhaps want to get pregnant again and will need to come off and they use more vitamins and minerals    Today's PHQ-2 Score:   PHQ-2 ( 1999 [employer]) 2/22/2019 6/22/2018   Q1: Little interest or pleasure in doing things 1 0   Q2: Feeling down, depressed or hopeless 1 0   PHQ-2 Score 2 0       Abuse: Current or Past(Physical, Sexual or Emotional)- No  Do you feel safe in your environment? Yes    Social History     Tobacco Use     Smoking status: Never Smoker     Smokeless tobacco: Never Used   Substance Use Topics     Alcohol use: Yes     Comment: rarely     If you drink alcohol do you typically have >3 drinks per day or >7 drinks per week? No                      Reviewed orders with patient.  Reviewed health maintenance and updated orders accordingly - Yes  Labs reviewed in EPIC  BP Readings from Last 3 Encounters:   02/22/19 111/60   06/22/18 100/55   12/14/17 100/60    Wt Readings from Last 3 Encounters:   02/22/19 62 kg (136 lb 11.2 oz)   06/22/18 68.7 kg (151 lb 6.4 oz)   12/14/17 68.5 kg (151 lb 1.6 oz)                  Patient Active Problem List   Diagnosis     Contraception     Intermittent asthma     CARDIOVASCULAR SCREENING; LDL GOAL LESS THAN 160     Mild major depression (H)     Attention deficit disorder of adult     Past Surgical History:   Procedure Laterality Date     TONSILLECTOMY      1993       Social History     Tobacco Use     Smoking status: Never Smoker     Smokeless tobacco: Never Used   Substance Use Topics     Alcohol use: Yes     Comment: rarely     Family History   Problem Relation Age of Onset     Hypertension Father      Cerebrovascular Disease Father      Congenital Anomalies Father      Depression Father      Cardiovascular Father      Cancer - colorectal Maternal Grandfather      Hypertension Maternal Grandfather      Cancer - colorectal Paternal Grandmother      Breast Cancer Paternal Grandmother      Depression Maternal Uncle      Osteoporosis Maternal Grandmother      Asthma No family hx of      C.A.D. No family hx of      Diabetes No family hx of      Prostate Cancer No family hx of      Thyroid Disease No family hx of      Blood Disease No family hx of          Current Outpatient Medications   Medication Sig Dispense Refill     amphetamine-dextroamphetamine (ADDERALL) 30 MG tablet Take 1 tablet (30 mg) by mouth 2 times daily 60 tablet 0     Multiple Vitamin (MULTI-VITAMIN PO) Take  by mouth daily.       triamcinolone (KENALOG) 0.1 % cream Apply sparingly to affected area three times daily prn (Patient not taking: Reported on 2/22/2019) 30 g 1     Allergies   Allergen Reactions     Sulfa Drugs      Rash      Amoxil [Amoxicillin]  Rash       Mammogram not appropriate for this patient based on age.    Pertinent mammograms are reviewed under the imaging tab.  History of abnormal Pap smear: NO - age 30- 65 PAP every 3 years recommended  PAP / HPV Latest Ref Rng & Units 12/14/2017 4/10/2014 3/4/2013   PAP - NIL NIL ASC-US(A)   HPV 16 DNA NEG:Negative Negative - -   HPV 18 DNA NEG:Negative Negative - -   OTHER HR HPV NEG:Negative Negative - -     Reviewed and updated as needed this visit by clinical staff  Tobacco  Allergies  Meds  Med Hx  Surg Hx  Fam Hx  Soc Hx        Reviewed and updated as needed this visit by Provider        Past Medical History:   Diagnosis Date     ADD (attention deficit disorder)      Attention deficit disorder of adult 3/18/2013     Depression      Encounter for insertion of mirena IUD 09/2016     Intermittent asthma 10/10/2012     Mild major depression (H) 10/15/2012    Seeing [facility] for ADHD and ADD testing 12/2012. - see release of information. ARIADNE Parks       Past Surgical History:   Procedure Laterality Date     TONSILLECTOMY      1993       ROS:  CONSTITUTIONAL:POSITIVE  for weight loss and NEGATIVE  for anorexia and malaise  INTEGUMENTARY/SKIN: NEGATIVE for non-healing lesion  and POSITIVE for has had some new moles she has noticed   EYES: NEGATIVE for vision changes or irritation  ENT: NEGATIVE for ear, mouth and throat problems  RESP:NEGATIVE for significant cough or SOB  BREAST: NEGATIVE for masses, tenderness or discharge  CV: NEGATIVE for chest pain, palpitations or peripheral edema  GI: NEGATIVE for nausea, abdominal pain, heartburn, or change in bowel habits   female: no unusual urinary symptoms, no unusual vaginal symptoms and menses: has IUD very sporadic   Last pregnancy 2 years ago. Is having pain with intercourse for the last 2 years. Have done an ultrasound already.   Did go see [facility]. Is concerned could be psychological and tenses even before intercourse.  "  MUSCULOSKELETAL:NEGATIVE for significant arthralgias or myalgia  NEURO: NEGATIVE for weakness, dizziness or paresthesias  ENDOCRINE: NEGATIVE for temperature intolerance, skin/hair changes and POSITIVE  for weight loss  HEME/ALLERGY/IMMUNE: NEGATIVE for bleeding problems  PSYCHIATRIC: POSITIVE forconcentration difficulty, depressed mood, Hx anxiety, Hx depression and has seen a counselor in the past. Is concerned as has some seasonal affective disorder and will often need to go on in the winter. Will consider this  and NEGATIVE forthoughts of hurting someone else and thoughts of self harm       OBJECTIVE:   /60 (BP Location: Right arm, Patient Position: Sitting, Cuff Size: Adult Regular)   Pulse 85   Temp 97.4  F (36.3  C) (Oral)   Ht 1.746 m (5' 8.75\")   Wt 62 kg (136 lb 11.2 oz)   SpO2 100%   Breastfeeding? No   BMI 20.33 kg/m     Wt Readings from Last 4 Encounters:   02/22/19 62 kg (136 lb 11.2 oz)   06/22/18 68.7 kg (151 lb 6.4 oz)   12/14/17 68.5 kg (151 lb 1.6 oz)   04/28/15 69.9 kg (154 lb 3.2 oz)       EXAM:  GENERAL: healthy, alert and no distress  EYES: Eyes grossly normal to inspection, PERRL and conjunctivae and sclerae normal  HENT: ear canals and TM's normal, nose and mouth without ulcers or lesions  NECK: no adenopathy, no asymmetry, masses, or scars and thyroid normal to palpation  RESP: lungs clear to auscultation - no rales, rhonchi or wheezes  BREAST: normal without masses, tenderness or nipple discharge and no palpable axillary masses or adenopathy  CV: regular rates and rhythm, no murmur, click or rub, peripheral pulses strong and no peripheral edema  ABDOMEN: soft, nontender, no hepatosplenomegaly, no masses and bowel sounds normal   (female): normal female external genitalia, normal urethral meatus, vaginal mucosa pink, moist, well rugated, and normal cervix/adnexa/uterus without masses or discharge  MS: no gross musculoskeletal defects noted, no edema  SKIN: no suspicious " lesions or rashes  NEURO: Normal strength and tone, mentation intact and speech normal  PSYCH: mentation appears normal, affect normal/bright  LYMPH: no cervical, supraclavicular, axillary, or inguinal adenopathy    Diagnostic Test Results:  Results for orders placed or performed in visit on 02/22/19   TSH   Result Value Ref Range    TSH 1.51 0.40 - 4.00 mU/L   T4 free   Result Value Ref Range    T4 Free 0.91 0.76 - 1.46 ng/dL       ASSESSMENT/PLAN:   Conchita was seen today for physical.    Diagnoses and all orders for this visit:    Encounter for routine adult health examination without abnormal findings  -     TSH  -     T4 free    Attention deficit disorder of adult  -     amphetamine-dextroamphetamine (ADDERALL) 30 MG tablet; Take 1 tablet (30 mg) by mouth 2 times daily  -     amphetamine-dextroamphetamine (ADDERALL) 30 MG tablet; Take 1 tablet (30 mg) by mouth 2 times daily  -     amphetamine-dextroamphetamine (ADDERALL) 30 MG tablet; Take 1 tablet (30 mg) by mouth 2 times daily  Continue current medication regimen unchanged.  PLAN:  Follow up in 6 months.  Please return to clinic earlier if symptoms worsen or new problems arise.  Continue same medications and plan.    Screening for thyroid disorder  -     TSH  -     T4 free    Numerous moles  -     DERMATOLOGY REFERRAL    PLAN:   1.   Symptomatic therapy suggested: consider Wellbutrin for seasonal affective.  2.  Orders Placed This Encounter   Medications     amphetamine-dextroamphetamine (ADDERALL) 30 MG tablet     Sig: Take 1 tablet (30 mg) by mouth 2 times daily     Dispense:  60 tablet     Refill:  0     amphetamine-dextroamphetamine (ADDERALL) 30 MG tablet     Sig: Take 1 tablet (30 mg) by mouth 2 times daily     Dispense:  60 tablet     Refill:  0     amphetamine-dextroamphetamine (ADDERALL) 30 MG tablet     Sig: Take 1 tablet (30 mg) by mouth 2 times daily     Dispense:  60 tablet     Refill:  0     Orders Placed This Encounter   Procedures     TSH      "T4 free     DERMATOLOGY REFERRAL       3. Patient needs to follow up in if no improvement,or sooner if worsening of symptoms or other symptoms develop.  Will follow up and/or notify patient of  results via My Chart to determine further need for followup  Follow up in 6 months.  Follow up office visit in one year for annual health maintenance exam, sooner PRN.      COUNSELING:   Reviewed preventive health counseling, as reflected in patient instructions  Special attention given to:        Regular exercise       Healthy diet/nutrition       Vision screening       Contraception       Family planning    BP Readings from Last 1 Encounters:   02/22/19 111/60     Estimated body mass index is 20.33 kg/m  as calculated from the following:    Height as of this encounter: 1.746 m (5' 8.75\").    Weight as of this encounter: 62 kg (136 lb 11.2 oz).           reports that  has never smoked. she has never used smokeless tobacco.      Counseling Resources:  ATP IV Guidelines  Pooled Cohorts Equation Calculator  Breast Cancer Risk Calculator  FRAX Risk Assessment  ICSI Preventive Guidelines  Dietary Guidelines for Americans, 2010  USDA's MyPlate  ASA Prophylaxis  Lung CA Screening    MARIAA Richard [facility]  "

## 2019-02-23 ASSESSMENT — ASTHMA QUESTIONNAIRES: ACT_TOTALSCORE: 25

## 2019-02-23 ASSESSMENT — ANXIETY QUESTIONNAIRES: GAD7 TOTAL SCORE: 14

## 2019-03-20 ENCOUNTER — MYC MEDICAL ADVICE (OUTPATIENT)
Dept: DERMATOLOGY | Facility: CLINIC | Age: 34
End: 2019-03-20

## 2019-04-09 DIAGNOSIS — Z00.00 ENCOUNTER FOR ROUTINE ADULT HEALTH EXAMINATION WITHOUT ABNORMAL FINDINGS: ICD-10-CM

## 2019-04-09 DIAGNOSIS — Z13.6 CARDIOVASCULAR SCREENING; LDL GOAL LESS THAN 160: ICD-10-CM

## 2019-04-09 DIAGNOSIS — Z13.1 SCREENING FOR DIABETES MELLITUS (DM): ICD-10-CM

## 2019-04-09 LAB
ANION GAP SERPL CALCULATED.3IONS-SCNC: 6 MMOL/L (ref 3–14)
BUN SERPL-MCNC: 12 MG/DL (ref 7–30)
CALCIUM SERPL-MCNC: 8.8 MG/DL (ref 8.5–10.1)
CHLORIDE SERPL-SCNC: 109 MMOL/L (ref 94–109)
CHOLEST SERPL-MCNC: 150 MG/DL
CO2 SERPL-SCNC: 25 MMOL/L (ref 20–32)
CREAT SERPL-MCNC: 0.67 MG/DL (ref 0.52–1.04)
GFR SERPL CREATININE-BSD FRML MDRD: >90 ML/MIN/{1.73_M2}
GLUCOSE SERPL-MCNC: 80 MG/DL (ref 70–99)
HDLC SERPL-MCNC: 68 MG/DL
LDLC SERPL CALC-MCNC: 72 MG/DL
NONHDLC SERPL-MCNC: 82 MG/DL
POTASSIUM SERPL-SCNC: 4.2 MMOL/L (ref 3.4–5.3)
SODIUM SERPL-SCNC: 140 MMOL/L (ref 133–144)
TRIGL SERPL-MCNC: 49 MG/DL

## 2019-04-09 PROCEDURE — 36415 COLL VENOUS BLD VENIPUNCTURE: CPT | Performed by: NURSE PRACTITIONER

## 2019-04-09 PROCEDURE — 80061 LIPID PANEL: CPT | Performed by: NURSE PRACTITIONER

## 2019-04-09 PROCEDURE — 80048 BASIC METABOLIC PNL TOTAL CA: CPT | Performed by: NURSE PRACTITIONER

## 2019-04-10 NOTE — RESULT ENCOUNTER NOTE
Juana Linares,    Attached are your test results.  -Cholesterol levels (LDL,HDL, Triglycerides) are normal.  ADVISE: rechecking in 1 year.   -Kidney function is normal (Cr, GFR), Sodium is normal, Potassium is normal, Calcium is normal, Glucose is normal.    Please contact us if you have any questions.    Gaye Branch, CNP

## 2019-05-15 ENCOUNTER — MYC REFILL (OUTPATIENT)
Dept: PEDIATRICS | Facility: CLINIC | Age: 34
End: 2019-05-15

## 2019-05-15 ENCOUNTER — MYC MEDICAL ADVICE (OUTPATIENT)
Dept: PEDIATRICS | Facility: CLINIC | Age: 34
End: 2019-05-15

## 2019-05-15 DIAGNOSIS — F98.8 ATTENTION DEFICIT DISORDER OF ADULT: ICD-10-CM

## 2019-05-15 RX ORDER — DEXTROAMPHETAMINE SACCHARATE, AMPHETAMINE ASPARTATE, DEXTROAMPHETAMINE SULFATE AND AMPHETAMINE SULFATE 7.5; 7.5; 7.5; 7.5 MG/1; MG/1; MG/1; MG/1
30 TABLET ORAL 2 TIMES DAILY
Qty: 60 TABLET | Refills: 0 | Status: CANCELLED | OUTPATIENT
Start: 2019-05-15

## 2019-05-15 RX ORDER — DEXTROAMPHETAMINE SACCHARATE, AMPHETAMINE ASPARTATE, DEXTROAMPHETAMINE SULFATE AND AMPHETAMINE SULFATE 7.5; 7.5; 7.5; 7.5 MG/1; MG/1; MG/1; MG/1
30 TABLET ORAL 2 TIMES DAILY
Qty: 134 TABLET | Refills: 0 | Status: SHIPPED | OUTPATIENT
Start: 2019-05-15 | End: 2019-06-03

## 2019-05-15 RX ORDER — DEXTROAMPHETAMINE SACCHARATE, AMPHETAMINE ASPARTATE, DEXTROAMPHETAMINE SULFATE AND AMPHETAMINE SULFATE 7.5; 7.5; 7.5; 7.5 MG/1; MG/1; MG/1; MG/1
30 TABLET ORAL 2 TIMES DAILY
Qty: 60 TABLET | Refills: 0 | Status: SHIPPED | OUTPATIENT
Start: 2019-05-22 | End: 2019-07-08 | Stop reason: ALTCHOICE

## 2019-05-15 RX ORDER — DEXTROAMPHETAMINE SACCHARATE, AMPHETAMINE ASPARTATE, DEXTROAMPHETAMINE SULFATE AND AMPHETAMINE SULFATE 7.5; 7.5; 7.5; 7.5 MG/1; MG/1; MG/1; MG/1
30 TABLET ORAL 2 TIMES DAILY
Qty: 60 TABLET | Refills: 0 | Status: SHIPPED | OUTPATIENT
Start: 2019-06-21 | End: 2019-06-03 | Stop reason: SINTOL

## 2019-05-15 NOTE — TELEPHONE ENCOUNTER
amphetamine-dextroamphetamine (ADDERALL) 30 MG tablet    Last Written Prescription Date:  4/23/19  Last Fill Quantity: 60,  # refills: 0   Last office visit: 2/22/2019 with prescribing provider:     Future Office Visit:      Routing refill request to provider for review/approval because:  Drug not on the [facility] refill protocol     Please see mychart message.    Kristy Tavarez RN   [facility]

## 2019-05-15 NOTE — TELEPHONE ENCOUNTER
Adderall      Last Written Prescription Date:  4/23  Last Fill Quantity: 60,   # refills: 0  Last Office Visit: 2/22  Future Office visit:       Routing refill request to provider for review/approval because:  Drug not on the [facility], [facility] or [facility] refill protocol or controlled substance

## 2019-05-21 ENCOUNTER — TELEPHONE (OUTPATIENT)
Dept: PEDIATRICS | Facility: CLINIC | Age: 34
End: 2019-05-21

## 2019-05-21 NOTE — TELEPHONE ENCOUNTER
José, the 134 tablets was a 2 month supply, but they will destroy it since patient has a script from 5/22 #60 that they will fill now.  Montse Cunha RN

## 2019-05-31 ENCOUNTER — MYC MEDICAL ADVICE (OUTPATIENT)
Dept: PEDIATRICS | Facility: CLINIC | Age: 34
End: 2019-05-31

## 2019-05-31 NOTE — TELEPHONE ENCOUNTER
Waiting on patient response regarding appointment scheduled with Gaye Branch CNP on 6/3/19.  Doroteo SANCHEZ CMA

## 2019-06-03 ENCOUNTER — OFFICE VISIT (OUTPATIENT)
Dept: PEDIATRICS | Facility: CLINIC | Age: 34
End: 2019-06-03
Payer: COMMERCIAL

## 2019-06-03 VITALS
DIASTOLIC BLOOD PRESSURE: 70 MMHG | TEMPERATURE: 98 F | BODY MASS INDEX: 20.07 KG/M2 | HEART RATE: 89 BPM | WEIGHT: 134.9 LBS | SYSTOLIC BLOOD PRESSURE: 110 MMHG | OXYGEN SATURATION: 97 %

## 2019-06-03 DIAGNOSIS — F98.8 ATTENTION DEFICIT DISORDER OF ADULT: Primary | ICD-10-CM

## 2019-06-03 DIAGNOSIS — N94.10 DYSPAREUNIA IN FEMALE: ICD-10-CM

## 2019-06-03 PROCEDURE — 99214 OFFICE O/P EST MOD 30 MIN: CPT | Performed by: NURSE PRACTITIONER

## 2019-06-03 RX ORDER — LISDEXAMFETAMINE DIMESYLATE 30 MG/1
30 CAPSULE ORAL EVERY MORNING
Qty: 30 CAPSULE | Refills: 0 | Status: SHIPPED | OUTPATIENT
Start: 2019-06-03 | End: 2019-06-24

## 2019-06-03 ASSESSMENT — ANXIETY QUESTIONNAIRES
1. FEELING NERVOUS, ANXIOUS, OR ON EDGE: MORE THAN HALF THE DAYS
IF YOU CHECKED OFF ANY PROBLEMS ON THIS QUESTIONNAIRE, HOW DIFFICULT HAVE THESE PROBLEMS MADE IT FOR YOU TO DO YOUR WORK, TAKE CARE OF THINGS AT HOME, OR GET ALONG WITH OTHER PEOPLE: SOMEWHAT DIFFICULT
3. WORRYING TOO MUCH ABOUT DIFFERENT THINGS: SEVERAL DAYS
5. BEING SO RESTLESS THAT IT IS HARD TO SIT STILL: NOT AT ALL
2. NOT BEING ABLE TO STOP OR CONTROL WORRYING: SEVERAL DAYS
GAD7 TOTAL SCORE: 7
7. FEELING AFRAID AS IF SOMETHING AWFUL MIGHT HAPPEN: NOT AT ALL
6. BECOMING EASILY ANNOYED OR IRRITABLE: MORE THAN HALF THE DAYS

## 2019-06-03 ASSESSMENT — PATIENT HEALTH QUESTIONNAIRE - PHQ9
SUM OF ALL RESPONSES TO PHQ QUESTIONS 1-9: 0
5. POOR APPETITE OR OVEREATING: SEVERAL DAYS

## 2019-06-03 NOTE — PROGRESS NOTES
Subjective     Conchita Linares is a 33 year old female who presents to clinic today for the following health issues:    HPI     1. Patient had questions about the pelvic floor therapist referral   Has had pain with intercourse for at least 2 years  Had a normal pelvic ultrasound in 2017   Feels like is emotional and physical and is seeing a counselor already   Now needs to see a pelvic        Medication Followup of adderall 30 mg    Taking Medication as prescribed: yes    Side Effects:  Migraine headaches    Medication Helping Symptoms:  yes   Was at [facility] and saw provider in integrative medicine and discussed dietary interventions  Was on the Adderal for several years and just dealt with the headaches   Has not been on any other medication before and at this point is willing to try something else   Just dealt with the adderal as knew it worked but gave her headaches and figured this out when stopping it for pregnancy     Patient Active Problem List   Diagnosis     Contraception     Intermittent asthma     CARDIOVASCULAR SCREENING; LDL GOAL LESS THAN 160     Mild major depression (H)     Attention deficit disorder of adult     Past Surgical History:   Procedure Laterality Date     TONSILLECTOMY      1993       Social History     Tobacco Use     Smoking status: Never Smoker     Smokeless tobacco: Never Used   Substance Use Topics     Alcohol use: Yes     Comment: rarely     Family History   Problem Relation Age of Onset     Hypertension Father      Cerebrovascular Disease Father      Congenital Anomalies Father      Depression Father      Cardiovascular Father      Other Cancer Mother      Osteoporosis Mother      Cancer - colorectal Maternal Grandfather      Hypertension Maternal Grandfather      Cancer - colorectal Paternal Grandmother      Breast Cancer Paternal Grandmother      Depression Maternal Uncle      Osteoporosis Maternal Grandmother      Asthma No family hx of      C.A.D. No family hx  of      Diabetes No family hx of      Prostate Cancer No family hx of      Thyroid Disease No family hx of      Blood Disease No family hx of          Current Outpatient Medications   Medication Sig Dispense Refill     [START ON 6/21/2019] amphetamine-dextroamphetamine (ADDERALL) 30 MG tablet Take 1 tablet (30 mg) by mouth 2 times daily 60 tablet 0     amphetamine-dextroamphetamine (ADDERALL) 30 MG tablet Take 1 tablet (30 mg) by mouth 2 times daily 60 tablet 0     Multiple Vitamin (MULTI-VITAMIN PO) Take  by mouth daily.       triamcinolone (KENALOG) 0.1 % cream Apply sparingly to affected area three times daily prn (Patient not taking: Reported on 2/22/2019) 30 g 1     Allergies   Allergen Reactions     Sulfa Drugs      Rash      Amoxil [Amoxicillin] Rash     BP Readings from Last 3 Encounters:   06/03/19 110/70   02/22/19 111/60   06/22/18 100/55    Wt Readings from Last 3 Encounters:   06/03/19 61.2 kg (134 lb 14.4 oz)   02/22/19 62 kg (136 lb 11.2 oz)   06/22/18 68.7 kg (151 lb 6.4 oz)            Reviewed and updated as needed this visit by Provider         Review of Systems   ROS COMP: Constitutional, HEENT, cardiovascular, pulmonary, gi and gu systems are negative, except as otherwise noted.      Objective    /70 (BP Location: Right arm, Patient Position: Sitting, Cuff Size: Adult Regular)   Pulse 89   Temp 98  F (36.7  C) (Temporal)   Wt 61.2 kg (134 lb 14.4 oz)   SpO2 97%   Breastfeeding? No   BMI 20.07 kg/m    Body mass index is 20.07 kg/m .  Physical Exam   GENERAL: healthy, alert and no distress  HENT: ear canals and TM's normal, nose and mouth without ulcers or lesions  NECK: no adenopathy, no asymmetry, masses, or scars and thyroid normal to palpation  RESP: lungs clear to auscultation - no rales, rhonchi or wheezes  CV: regular rates and rhythm, no murmur, click or rub, peripheral pulses strong and no peripheral edema  MS: no gross musculoskeletal defects noted, no edema  SKIN: no  suspicious lesions or rashes  NEURO: Normal strength and tone, mentation intact and speech normal  PSYCH: mentation appears normal, affect normal/bright    Diagnostic Test Results:  Labs reviewed in Epic        Assessment & Plan     Conchita was seen today for patient request.    Diagnoses and all orders for this visit:    Attention deficit disorder of adult  -     Start : lisdexamfetamine (VYVANSE) 30 MG capsule; Take 1 capsule (30 mg) by mouth every morning  PLAN:  Please return to clinic earlier if symptoms worsen or new problems arise.  Follow up in 1 month.  Follow up PRN not improving as anticipated.    Dyspareunia in female  -     PHYSICAL THERAPY REFERRAL; Future      See Patient Instructions  Patient Instructions     PLAN:   1.   Symptomatic therapy suggested: stop the Adderal   Will start on Vyvanse 30 mg a day to start    2.  Orders Placed This Encounter   Medications     lisdexamfetamine (VYVANSE) 30 MG capsule     Sig: Take 1 capsule (30 mg) by mouth every morning     Dispense:  30 capsule     Refill:  0     Orders Placed This Encounter   Procedures     PHYSICAL THERAPY REFERRAL       3. Patient needs to follow up in if no improvement,or sooner if worsening of symptoms or other symptoms develop.  CONSULTATION/REFERRAL to physical therapy for pelvic therapy   Follow up in 1 month on My chart on how the Vyvanse is working   Follow up appointment in 6 months   25 min spent in direct face to face time with this pt, greater than 50% in counseling and coordination of care.        No follow-ups on file.    MARIAA Richard CNP  [facility]

## 2019-06-03 NOTE — PATIENT INSTRUCTIONS
PLAN:   1.   Symptomatic therapy suggested: stop the Adderal   Will start on Vyvanse 30 mg a day to start    2.  Orders Placed This Encounter   Medications     lisdexamfetamine (VYVANSE) 30 MG capsule     Sig: Take 1 capsule (30 mg) by mouth every morning     Dispense:  30 capsule     Refill:  0     Orders Placed This Encounter   Procedures     PHYSICAL THERAPY REFERRAL       3. Patient needs to follow up in if no improvement,or sooner if worsening of symptoms or other symptoms develop.  CONSULTATION/REFERRAL to physical therapy for pelvic therapy   Follow up in 1 month on My chart on how the Vyvanse is working   Follow up appointment in 6 months

## 2019-06-03 NOTE — NURSING NOTE
"Chief Complaint   Patient presents with     Patient Request     patient has questions about pelvic floor therapist       Initial /70 (BP Location: Right arm, Patient Position: Sitting, Cuff Size: Adult Regular)   Pulse 89   Temp 98  F (36.7  C) (Temporal)   Wt 61.2 kg (134 lb 14.4 oz)   SpO2 97%   Breastfeeding? No   BMI 20.07 kg/m   Estimated body mass index is 20.07 kg/m  as calculated from the following:    Height as of 2/22/19: 1.746 m (5' 8.75\").    Weight as of this encounter: 61.2 kg (134 lb 14.4 oz).  Medication Reconciliation: complete      SARAHI Keating      "

## 2019-06-04 ENCOUNTER — MYC MEDICAL ADVICE (OUTPATIENT)
Dept: PEDIATRICS | Facility: CLINIC | Age: 34
End: 2019-06-04

## 2019-06-04 ASSESSMENT — ANXIETY QUESTIONNAIRES: GAD7 TOTAL SCORE: 7

## 2019-06-04 NOTE — TELEPHONE ENCOUNTER
Central Prior Authorization Team   Phone: [phone]      PA Initiation    Medication: lisdexamfetamine (VYVANSE) 30 MG capsule-PA Pending  Insurance Company: [employer] - Phone [phone] Fax [phone]  Pharmacy Filling the Rx: [id number] - [place], [place] - [address] AT [facility] & [address]  Filling Pharmacy Phone: [phone]  Filling Pharmacy Fax: [phone]  Start Date: 6/4/2019

## 2019-06-04 NOTE — TELEPHONE ENCOUNTER
Called [facility] pharmacy to verify that patients insurance plan does not cover Rx for lisdexamfetamine (VYVANSE) 30 MG capsule. Patient needs change of medication or prior authorization.     Insurance Clear Script Bin #[id number]    ID# [id number]    Sent [employer] response to patient informing we will forward to PA department to initiate PA with insurance.  Doroteo SANCHEZ CMA

## 2019-06-04 NOTE — TELEPHONE ENCOUNTER
Prior Authorization Approval    Authorization Effective Date: 6/4/2019  Authorization Expiration Date: 6/3/2020  Medication: lisdexamfetamine (VYVANSE) 30 MG capsule-APPROVED  Approved Dose/Quantity:   Reference #: [id number]   Insurance Company: [employer] - Phone [phone] Fax [phone]  Expected CoPay:       CoPay Card Available:      Foundation Assistance Needed:    Which Pharmacy is filling the prescription (Not needed for infusion/clinic administered): [facility] [address] [place] [address] [id number] AT [facility] & [address]  Pharmacy Notified: Yes-Pharmacy will notify patient when ready.  Patient Notified: No

## 2019-06-24 ENCOUNTER — MYC MEDICAL ADVICE (OUTPATIENT)
Dept: PEDIATRICS | Facility: CLINIC | Age: 34
End: 2019-06-24

## 2019-06-24 DIAGNOSIS — F98.8 ATTENTION DEFICIT DISORDER OF ADULT: ICD-10-CM

## 2019-06-24 RX ORDER — LISDEXAMFETAMINE DIMESYLATE 40 MG/1
40 CAPSULE ORAL EVERY MORNING
Qty: 30 CAPSULE | Refills: 0 | Status: SHIPPED | OUTPATIENT
Start: 2019-07-03 | End: 2019-08-02

## 2019-06-26 ENCOUNTER — MYC MEDICAL ADVICE (OUTPATIENT)
Dept: PEDIATRICS | Facility: CLINIC | Age: 34
End: 2019-06-26

## 2019-06-27 DIAGNOSIS — Z13.0 SCREENING FOR DISORDER OF BLOOD AND BLOOD-FORMING ORGANS: ICD-10-CM

## 2019-06-27 DIAGNOSIS — Z13.6 CARDIOVASCULAR SCREENING; LDL GOAL LESS THAN 160: ICD-10-CM

## 2019-06-27 DIAGNOSIS — F98.8 ATTENTION DEFICIT DISORDER OF ADULT: ICD-10-CM

## 2019-06-27 DIAGNOSIS — Z13.1 SCREENING FOR DIABETES MELLITUS (DM): ICD-10-CM

## 2019-06-27 LAB
ANION GAP SERPL CALCULATED.3IONS-SCNC: 5 MMOL/L (ref 3–14)
BASOPHILS # BLD AUTO: 0 10E9/L (ref 0–0.2)
BASOPHILS NFR BLD AUTO: 0.6 %
BUN SERPL-MCNC: 10 MG/DL (ref 7–30)
CALCIUM SERPL-MCNC: 8.6 MG/DL (ref 8.5–10.1)
CHLORIDE SERPL-SCNC: 107 MMOL/L (ref 94–109)
CHOLEST SERPL-MCNC: 109 MG/DL
CO2 SERPL-SCNC: 29 MMOL/L (ref 20–32)
CREAT SERPL-MCNC: 0.65 MG/DL (ref 0.52–1.04)
DEPRECATED CALCIDIOL+CALCIFEROL SERPL-MC: 57 UG/L (ref 20–75)
DIFFERENTIAL METHOD BLD: NORMAL
EOSINOPHIL # BLD AUTO: 0.1 10E9/L (ref 0–0.7)
EOSINOPHIL NFR BLD AUTO: 1.4 %
ERYTHROCYTE [DISTWIDTH] IN BLOOD BY AUTOMATED COUNT: 11.9 % (ref 10–15)
FERRITIN SERPL-MCNC: 15 NG/ML (ref 12–150)
FOLATE SERPL-MCNC: 31.7 NG/ML
GFR SERPL CREATININE-BSD FRML MDRD: >90 ML/MIN/{1.73_M2}
GLUCOSE SERPL-MCNC: 81 MG/DL (ref 70–99)
HCT VFR BLD AUTO: 41.6 % (ref 35–47)
HDLC SERPL-MCNC: 54 MG/DL
HGB BLD-MCNC: 13.8 G/DL (ref 11.7–15.7)
IMM GRANULOCYTES # BLD: 0 10E9/L (ref 0–0.4)
IMM GRANULOCYTES NFR BLD: 0.2 %
IRON SERPL-MCNC: 98 UG/DL (ref 35–180)
LDLC SERPL CALC-MCNC: 45 MG/DL
LYMPHOCYTES # BLD AUTO: 2.3 10E9/L (ref 0.8–5.3)
LYMPHOCYTES NFR BLD AUTO: 46.9 %
MAGNESIUM SERPL-MCNC: 2.1 MG/DL (ref 1.6–2.3)
MCH RBC QN AUTO: 30.2 PG (ref 26.5–33)
MCHC RBC AUTO-ENTMCNC: 33.2 G/DL (ref 31.5–36.5)
MCV RBC AUTO: 91 FL (ref 78–100)
MONOCYTES # BLD AUTO: 0.3 10E9/L (ref 0–1.3)
MONOCYTES NFR BLD AUTO: 5.6 %
NEUTROPHILS # BLD AUTO: 2.3 10E9/L (ref 1.6–8.3)
NEUTROPHILS NFR BLD AUTO: 45.3 %
NONHDLC SERPL-MCNC: 55 MG/DL
PLATELET # BLD AUTO: 215 10E9/L (ref 150–450)
POTASSIUM SERPL-SCNC: 3.9 MMOL/L (ref 3.4–5.3)
RBC # BLD AUTO: 4.57 10E12/L (ref 3.8–5.2)
SODIUM SERPL-SCNC: 141 MMOL/L (ref 133–144)
TRANSFERRIN SERPL-MCNC: 226 MG/DL (ref 210–360)
TRIGL SERPL-MCNC: 51 MG/DL
VIT B12 SERPL-MCNC: 401 PG/ML (ref 193–986)
WBC # BLD AUTO: 5 10E9/L (ref 4–11)

## 2019-06-27 PROCEDURE — 83540 ASSAY OF IRON: CPT | Performed by: NURSE PRACTITIONER

## 2019-06-27 PROCEDURE — 80061 LIPID PANEL: CPT | Performed by: NURSE PRACTITIONER

## 2019-06-27 PROCEDURE — 84630 ASSAY OF ZINC: CPT | Mod: 90 | Performed by: NURSE PRACTITIONER

## 2019-06-27 PROCEDURE — 84466 ASSAY OF TRANSFERRIN: CPT | Performed by: NURSE PRACTITIONER

## 2019-06-27 PROCEDURE — 82728 ASSAY OF FERRITIN: CPT | Performed by: NURSE PRACTITIONER

## 2019-06-27 PROCEDURE — 83735 ASSAY OF MAGNESIUM: CPT | Performed by: NURSE PRACTITIONER

## 2019-06-27 PROCEDURE — 85025 COMPLETE CBC W/AUTO DIFF WBC: CPT | Performed by: NURSE PRACTITIONER

## 2019-06-27 PROCEDURE — 82306 VITAMIN D 25 HYDROXY: CPT | Performed by: NURSE PRACTITIONER

## 2019-06-27 PROCEDURE — 36415 COLL VENOUS BLD VENIPUNCTURE: CPT | Performed by: NURSE PRACTITIONER

## 2019-06-27 PROCEDURE — 80048 BASIC METABOLIC PNL TOTAL CA: CPT | Performed by: NURSE PRACTITIONER

## 2019-06-27 PROCEDURE — 82607 VITAMIN B-12: CPT | Performed by: NURSE PRACTITIONER

## 2019-06-27 PROCEDURE — 99000 SPECIMEN HANDLING OFFICE-LAB: CPT | Performed by: NURSE PRACTITIONER

## 2019-06-27 PROCEDURE — 82746 ASSAY OF FOLIC ACID SERUM: CPT | Performed by: NURSE PRACTITIONER

## 2019-06-27 NOTE — RESULT ENCOUNTER NOTE
Juana Linares,    Attached are your test results.  -Normal red blood cell (hgb) levels, normal white blood cell count and normal platelet levels.  -Cholesterol levels (LDL,HDL, Triglycerides) are normal.  ADVISE: rechecking in 1 year.   -Kidney function is normal (Cr, GFR), Sodium is normal, Potassium is normal, Calcium is normal, Glucose is normal.   -Ferritin (iron) level is normal.   Please contact us if you have any questions.    Gaye Branch, CNP

## 2019-06-29 LAB — ZINC SERPL-MCNC: 70.6 UG/DL (ref 60–120)

## 2019-06-30 NOTE — RESULT ENCOUNTER NOTE
Juana Linares,    Attached are your test results.  -All of your labs are normal.   Please contact us if you have any questions.    Gaye Branch, CNP

## 2019-07-08 ENCOUNTER — MYC MEDICAL ADVICE (OUTPATIENT)
Dept: PEDIATRICS | Facility: CLINIC | Age: 34
End: 2019-07-08

## 2019-07-08 DIAGNOSIS — F98.8 ATTENTION DEFICIT DISORDER OF ADULT: Primary | ICD-10-CM

## 2019-07-08 NOTE — TELEPHONE ENCOUNTER
Routing MyChart question to Kimmy Branch NP to review when able.        Conchita Blas RN, [facility], [place]

## 2019-07-15 ENCOUNTER — MYC MEDICAL ADVICE (OUTPATIENT)
Dept: PEDIATRICS | Facility: CLINIC | Age: 34
End: 2019-07-15

## 2019-07-15 DIAGNOSIS — N94.10 DYSPAREUNIA IN FEMALE: Primary | ICD-10-CM

## 2019-07-29 DIAGNOSIS — Z79.899 ENCOUNTER FOR LONG-TERM (CURRENT) USE OF OTHER MEDICATIONS: Primary | ICD-10-CM

## 2019-07-29 PROCEDURE — 80307 DRUG TEST PRSMV CHEM ANLYZR: CPT | Mod: 90 | Performed by: FAMILY MEDICINE

## 2019-07-29 PROCEDURE — 99000 SPECIMEN HANDLING OFFICE-LAB: CPT | Performed by: FAMILY MEDICINE

## 2019-08-01 LAB — COMPREHEN DRUG ANALYSIS UR: NORMAL

## 2019-10-02 ENCOUNTER — HEALTH MAINTENANCE LETTER (OUTPATIENT)
Age: 34
End: 2019-10-02

## 2020-02-25 NOTE — PROGRESS NOTES
Subjective     Conchita Linares is a 34 year old female who presents to clinic today for the following health issues:    HPI   Chief Complaint   Patient presents with     Cough     feels like settleling in lungs and chest. Was seen in ER on 2/11     Cough   Voice been raspy  No PND drip, No SOB, hard to get internal jugular a full breathe,  Has a little wheezing; has exercise induced asthma  Got inhaler when went to the ER  Cough up a little  Fever at onset,   Been taking cough med as needed    Patient Active Problem List   Diagnosis     Contraception     Intermittent asthma     CARDIOVASCULAR SCREENING; LDL GOAL LESS THAN 160     Mild major depression (H)     Attention deficit disorder of adult     Past Surgical History:   Procedure Laterality Date     TONSILLECTOMY      1993       Social History     Tobacco Use     Smoking status: Never Smoker     Smokeless tobacco: Never Used   Substance Use Topics     Alcohol use: Yes     Comment: rarely     Family History   Problem Relation Age of Onset     Hypertension Father      Cerebrovascular Disease Father      Congenital Anomalies Father      Depression Father      Cardiovascular Father      Other Cancer Mother      Osteoporosis Mother      Cancer - colorectal Maternal Grandfather      Hypertension Maternal Grandfather      Cancer - colorectal Paternal Grandmother      Breast Cancer Paternal Grandmother      Depression Maternal Uncle      Osteoporosis Maternal Grandmother      Asthma No family hx of      C.A.D. No family hx of      Diabetes No family hx of      Prostate Cancer No family hx of      Thyroid Disease No family hx of      Blood Disease No family hx of          Review of Systems   ROS COMP: Constitutional, cardiovascular, gi and gu systems are negative, except as otherwise noted.      Objective    /60   Pulse 70   Temp 98  F (36.7  C) (Oral)   Wt 77.6 kg (171 lb)   SpO2 98%   BMI 25.44 kg/m    Body mass index is 25.44 kg/m .  Physical Exam    GENERAL: healthy, alert and no distress  RESP: lungs clear to auscultation - no rales, rhonchi or wheezes  CV: regular rate and rhythm,  no murmur, click or rub, no peripheral edema   MS: no gross musculoskeletal defects noted, no edema    Assessment & Plan     Conchita was seen today for cough.    Diagnoses and all orders for this visit:    Cough, persistent      Discussed general respiratory tract infection care including importance of hydration, rest, over the counter therapies and techniques to prevent future infection as well as transmission to others. Discussed signs or symptoms that would indicate need for recheck.    Mild major depression (H)    -   Well controlled    Return in about 1 week (around 3/4/2020) for Follow up for symptoms recheck.    José Unger MD  [facility]

## 2020-02-26 ENCOUNTER — OFFICE VISIT (OUTPATIENT)
Dept: FAMILY MEDICINE | Facility: CLINIC | Age: 35
End: 2020-02-26
Payer: COMMERCIAL

## 2020-02-26 VITALS
TEMPERATURE: 98 F | DIASTOLIC BLOOD PRESSURE: 60 MMHG | OXYGEN SATURATION: 98 % | HEART RATE: 70 BPM | SYSTOLIC BLOOD PRESSURE: 106 MMHG | BODY MASS INDEX: 25.44 KG/M2 | WEIGHT: 171 LBS

## 2020-02-26 DIAGNOSIS — R05.3 COUGH, PERSISTENT: Primary | ICD-10-CM

## 2020-02-26 DIAGNOSIS — F32.0 MILD MAJOR DEPRESSION (H): ICD-10-CM

## 2020-02-26 PROCEDURE — 99213 OFFICE O/P EST LOW 20 MIN: CPT | Performed by: FAMILY MEDICINE

## 2020-03-19 ENCOUNTER — TELEPHONE (OUTPATIENT)
Dept: FAMILY MEDICINE | Facility: CLINIC | Age: 35
End: 2020-03-19

## 2020-03-19 ENCOUNTER — MYC MEDICAL ADVICE (OUTPATIENT)
Dept: FAMILY MEDICINE | Facility: CLINIC | Age: 35
End: 2020-03-19

## 2020-04-02 NOTE — TELEPHONE ENCOUNTER
Spoke to patient and she stated she will complete ACT through my chart.  Conchita HAMMOND CMA ([facility])

## 2020-04-04 ASSESSMENT — ASTHMA QUESTIONNAIRES: ACT_TOTALSCORE: 22

## 2020-04-07 ENCOUNTER — VIRTUAL VISIT (OUTPATIENT)
Dept: PEDIATRICS | Facility: CLINIC | Age: 35
End: 2020-04-07
Payer: COMMERCIAL

## 2020-04-07 DIAGNOSIS — F98.8 ATTENTION DEFICIT DISORDER OF ADULT: ICD-10-CM

## 2020-04-07 DIAGNOSIS — F43.23 ADJUSTMENT DISORDER WITH MIXED ANXIETY AND DEPRESSED MOOD: Primary | ICD-10-CM

## 2020-04-07 DIAGNOSIS — F32.0 MILD MAJOR DEPRESSION (H): ICD-10-CM

## 2020-04-07 PROCEDURE — 99442 ZZC PHYSICIAN TELEPHONE EVALUATION 11-20 MIN: CPT | Performed by: NURSE PRACTITIONER

## 2020-04-07 RX ORDER — DEXTROAMPHETAMINE SACCHARATE, AMPHETAMINE ASPARTATE, DEXTROAMPHETAMINE SULFATE AND AMPHETAMINE SULFATE 7.5; 7.5; 7.5; 7.5 MG/1; MG/1; MG/1; MG/1
30 TABLET ORAL 2 TIMES DAILY
Qty: 60 TABLET | Refills: 0 | Status: SHIPPED | OUTPATIENT
Start: 2020-04-07 | End: 2020-09-29

## 2020-04-07 RX ORDER — BUPROPION HYDROCHLORIDE 150 MG/1
TABLET, EXTENDED RELEASE ORAL
Qty: 180 TABLET | Refills: 2 | Status: SHIPPED | OUTPATIENT
Start: 2020-04-07 | End: 2020-09-29

## 2020-04-07 ASSESSMENT — ANXIETY QUESTIONNAIRES
3. WORRYING TOO MUCH ABOUT DIFFERENT THINGS: MORE THAN HALF THE DAYS
6. BECOMING EASILY ANNOYED OR IRRITABLE: MORE THAN HALF THE DAYS
5. BEING SO RESTLESS THAT IT IS HARD TO SIT STILL: NOT AT ALL
GAD7 TOTAL SCORE: 14
2. NOT BEING ABLE TO STOP OR CONTROL WORRYING: NEARLY EVERY DAY
1. FEELING NERVOUS, ANXIOUS, OR ON EDGE: NEARLY EVERY DAY
7. FEELING AFRAID AS IF SOMETHING AWFUL MIGHT HAPPEN: MORE THAN HALF THE DAYS

## 2020-04-07 ASSESSMENT — PATIENT HEALTH QUESTIONNAIRE - PHQ9
5. POOR APPETITE OR OVEREATING: MORE THAN HALF THE DAYS
SUM OF ALL RESPONSES TO PHQ QUESTIONS 1-9: 10

## 2020-04-07 NOTE — PROGRESS NOTES
"Conchita Linares is a 34 year old female who is being evaluated via a billable telephone visit.      The patient has been notified of following:     \"This telephone visit will be conducted via a call between you and your physician/provider. We have found that certain health care needs can be provided without the need for a physical exam.  This service lets us provide the care you need with a short phone conversation.  If a prescription is necessary we can send it directly to your pharmacy.  If lab work is needed we can place an order for that and you can then stop by our lab to have the test done at a later time.    Telephone visits are billed at different rates depending on your insurance coverage. During this emergency period, for some insurers they may be billed the same as an in-person visit.  Please reach out to your insurance provider with any questions.    If during the course of the call the physician/provider feels a telephone visit is not appropriate, you will not be charged for this service.\"    Patient has given verbal consent for Telephone visit?  Yes    Conchita Linares complains of    Chief Complaint   Patient presents with     Recheck Medication       I have reviewed and updated the patient's Past Medical History, Social History, Family History and Medication List.    ALLERGIES  Sulfa drugs and Amoxil [amoxicillin]  Subjective     Conchita Linares is a 34 year old female who presents to clinic today for the following health issues:    HPI   Depression and Anxiety Follow-Up    How are you doing with your depression since your last visit? Worsened     How are you doing with your anxiety since your last visit?  Worsened     Are you having other symptoms that might be associated with depression or anxiety? No    Have you had a significant life event? Health Concerns     Do you have any concerns with your use of alcohol or other drugs? No    Social History     Tobacco Use     Smoking status: Never " Smoker     Smokeless tobacco: Never Used   Substance Use Topics     Alcohol use: Yes     Comment: rarely     Drug use: No     PHQ 2/22/2019 6/3/2019 4/7/2020   PHQ-9 Total Score 13 0 10   Q9: Thoughts of better off dead/self-harm past 2 weeks Not at all Not at all Not at all     LEX-7 SCORE 2/22/2019 6/3/2019 4/7/2020   Total Score - - -   Total Score 14 7 14     Last PHQ-9 4/7/2020   1.  Little interest or pleasure in doing things 2   2.  Feeling down, depressed, or hopeless 2   3.  Trouble falling or staying asleep, or sleeping too much 0   4.  Feeling tired or having little energy 2   5.  Poor appetite or overeating 0   6.  Feeling bad about yourself 2   7.  Trouble concentrating 2   8.  Moving slowly or restless 0   Q9: Thoughts of better off dead/self-harm past 2 weeks 0   PHQ-9 Total Score 10   Difficulty at work, home, or with people -     In the past two weeks have you had thoughts of suicide or self-harm?  No.    Do you have concerns about your personal safety or the safety of others?   No  Has noticed has some seasonal affective disorder but the Covid has really aggravated the depression  Trying not to pay attention to the news  Has moments of anxiety but no panic attacks  No self medication with alcohol   Has tried prozac, lexapro , zoloft and then wellbutrin was worked best for her in the past     Also wants to get back on Adderal for her ADHD medications   Has been off this since October and tried several others and the option liked the best was the Adderal the best  Would like to try 30 mg a day and then not sure if wants to do a whole 30 mg in the afternoon and will try to cut them in half   Will plan to follow up on My chart in 1 month.     Suicide Assessment Five-step Evaluation and Treatment (SAFE-T)    Patient Active Problem List   Diagnosis     Contraception     Intermittent asthma     CARDIOVASCULAR SCREENING; LDL GOAL LESS THAN 160     Mild major depression (H)     Attention deficit disorder of  adult     Past Surgical History:   Procedure Laterality Date     TONSILLECTOMY      1993       Social History     Tobacco Use     Smoking status: Never Smoker     Smokeless tobacco: Never Used   Substance Use Topics     Alcohol use: Yes     Comment: rarely     Family History   Problem Relation Age of Onset     Hypertension Father      Cerebrovascular Disease Father      Congenital Anomalies Father      Depression Father      Cardiovascular Father      Other Cancer Mother      Osteoporosis Mother      Cancer - colorectal Maternal Grandfather      Hypertension Maternal Grandfather      Cancer - colorectal Paternal Grandmother      Breast Cancer Paternal Grandmother      Depression Maternal Uncle      Osteoporosis Maternal Grandmother      Asthma No family hx of      C.A.D. No family hx of      Diabetes No family hx of      Prostate Cancer No family hx of      Thyroid Disease No family hx of      Blood Disease No family hx of          Current Outpatient Medications   Medication Sig Dispense Refill     Multiple Vitamin (MULTI-VITAMIN PO) Take  by mouth daily.       Allergies   Allergen Reactions     Sulfa Drugs      Rash      Amoxil [Amoxicillin] Rash     BP Readings from Last 3 Encounters:   02/26/20 106/60   06/03/19 110/70   02/22/19 111/60    Wt Readings from Last 3 Encounters:   02/26/20 77.6 kg (171 lb)   06/03/19 61.2 kg (134 lb 14.4 oz)   02/22/19 62 kg (136 lb 11.2 oz)            Reviewed and updated as needed this visit by Provider         Review of Systems   ROS COMP: CONSTITUTIONAL:NEGATIVE  for anorexia, weight gain and weight loss  RESP:NEGATIVE for significant cough or SOB  CV: NEGATIVE for chest pain/chest pressure and palpitations  GI: NEGATIVE for poor appetite, vomiting and weight loss  MUSCULOSKELETAL: NEGATIVE for significant arthralgias or myalgia  NEURO: NEGATIVE for weakness, dizziness or paresthesias  ENDOCRINE: NEGATIVE for polydipsia, polyphagia and polyuria  PSYCHIATRIC: POSITIVE foranxiety,  depressed mood, Hx anxiety and Hx depression and NEGATIVE foralcohol abuse, drug usage, thoughts of hurting someone else and thoughts of self harm      Objective        Physical Exam   GENERAL APPEARANCE: alert, active and no distress  PSYCH: mentation appears normal and affect normal/bright  MENTAL STATUS EXAM:  Appearance/Behavior: No apparent distress  Speech: Normal  Mood/Affect: depressed affect and anxiety  Insight: Adequate    Diagnostic Test Results:  Labs reviewed in Epic        Assessment & Plan     Conchita was seen today for recheck medication.    Diagnoses and all orders for this visit:    Adjustment disorder with mixed anxiety and depressed mood  -     buPROPion (WELLBUTRIN SR) 150 MG 12 hr tablet; Take 1 tablet once daily and increase to 1 tablet twice daily after 4 to 7 days  Initiate medication with Wellbutrin   Reviewed concept of depression as function of biochemical imbalance of neurotransmitters/rationale for treatment.  Risks and benefits of medication(s) reviewed with patient.  Questions answered.  Counseling advised  Followup appointment in 1 month(s)  Patient instructed to call for significant side effects medications or problems  Patient advised immediate presentation to hospital for suicidal thought, etc.    Mild major depression (H)  Add on -     buPROPion (WELLBUTRIN SR) 150 MG 12 hr tablet; Take 1 tablet once daily and increase to 1 tablet twice daily after 4 to 7 days    Attention deficit disorder of adult  -     amphetamine-dextroamphetamine (ADDERALL) 30 MG tablet; Take 1 tablet (30 mg) by mouth 2 times daily  Restart ADD medications  Risk, benefit, intended purposes and side effect of medication discussed.  Prescribing practices for controlled substances discussed.      See Patient Instructions  Patient Instructions     PLAN:   1.   Symptomatic therapy suggested: start Wellbutrin  2.  Orders Placed This Encounter   Medications     buPROPion (WELLBUTRIN SR) 150 MG 12 hr tablet      Sig: Take 1 tablet once daily and increase to 1 tablet twice daily after 4 to 7 days     Dispense:  180 tablet     Refill:  2     3. Patient needs to follow up in if no improvement,or sooner if worsening of symptoms or other symptoms develop.  Follow up in 1 month.      Phone call duration: 20 minutes    MARIAA Richard CNP    No follow-ups on file.    MARIAA Richard CNP  [facility]

## 2020-04-07 NOTE — PATIENT INSTRUCTIONS
PLAN:   1.   Symptomatic therapy suggested: start Wellbutrin  2.  Orders Placed This Encounter   Medications     buPROPion (WELLBUTRIN SR) 150 MG 12 hr tablet     Sig: Take 1 tablet once daily and increase to 1 tablet twice daily after 4 to 7 days     Dispense:  180 tablet     Refill:  2     3. Patient needs to follow up in if no improvement,or sooner if worsening of symptoms or other symptoms develop.  Follow up in 1 month.

## 2020-04-08 ASSESSMENT — ANXIETY QUESTIONNAIRES: GAD7 TOTAL SCORE: 14

## 2020-09-29 ENCOUNTER — VIRTUAL VISIT (OUTPATIENT)
Dept: PEDIATRICS | Facility: CLINIC | Age: 35
End: 2020-09-29
Payer: COMMERCIAL

## 2020-09-29 ENCOUNTER — MYC MEDICAL ADVICE (OUTPATIENT)
Dept: PEDIATRICS | Facility: CLINIC | Age: 35
End: 2020-09-29

## 2020-09-29 DIAGNOSIS — F98.8 ATTENTION DEFICIT DISORDER OF ADULT: ICD-10-CM

## 2020-09-29 PROCEDURE — 99213 OFFICE O/P EST LOW 20 MIN: CPT | Mod: 95 | Performed by: NURSE PRACTITIONER

## 2020-09-29 RX ORDER — DEXTROAMPHETAMINE SACCHARATE, AMPHETAMINE ASPARTATE, DEXTROAMPHETAMINE SULFATE AND AMPHETAMINE SULFATE 7.5; 7.5; 7.5; 7.5 MG/1; MG/1; MG/1; MG/1
30 TABLET ORAL 2 TIMES DAILY
Qty: 60 TABLET | Refills: 0 | Status: SHIPPED | OUTPATIENT
Start: 2020-09-29 | End: 2020-10-27

## 2020-09-29 NOTE — PROGRESS NOTES
"Conchita Linares is a 34 year old female who is being evaluated via a billable video visit.      The patient has been notified of following:     \"This video visit will be conducted via a call between you and your physician/provider. We have found that certain health care needs can be provided without the need for an in-person physical exam.  This service lets us provide the care you need with a video conversation.  If a prescription is necessary we can send it directly to your pharmacy.  If lab work is needed we can place an order for that and you can then stop by our lab to have the test done at a later time.    Video visits are billed at different rates depending on your insurance coverage.  Please reach out to your insurance provider with any questions.    If during the course of the call the physician/provider feels a video visit is not appropriate, you will not be charged for this service.\"    Patient has given verbal consent for Video visit? Yes  How would you like to obtain your AVS? MyChart  If you are dropped from the video visit, the video invite should be resent to: Text to cell phone: [phone]  Will anyone else be joining your video visit? No    Subjective     Conchita Linares is a 34 year old female who presents today via video visit for the following health issues:    HPI    Medication Followup of adderall    Taking Medication as prescribed: yes    Side Effects:  None    Medication Helping Symptoms:  yes      decided not to take the Wellbutrin as her anxiety and depression symptoms improved     The adderal does work well for her and would like a refill  Conchita Linares is a 34 year old  female for a medication check and ADHD follow up.      MEDICATION BENEFITS:  Controlled symptoms:  Attention span, Distractability and Finishing tasks  Uncontrolled symptoms:  None     MEDICATION SIDE EFFECTS:   Has:  none  Denies:  appetite suppression, weight loss, insomnia, tics, palpitations, stomach " "ache, headache, emotional lability, rebound irritability, drowsiness, \"zombie\" effect, growth suppression and dry mouth    Video Start Time: 3:11 PM      Review of Systems   Constitutional, HEENT, cardiovascular, pulmonary, gi and gu systems are negative, except as otherwise noted.      Objective           Vitals:  No vitals were obtained today due to virtual visit.    Physical Exam     GENERAL: Healthy, alert and no distress  EYES: Eyes grossly normal to inspection and conjunctivae and sclerae normal  HENT: normal cephalic/atraumatic and oral mucous membranes moist  RESP: No audible wheeze, cough, or visible cyanosis.  No visible retractions or increased work of breathing.    MS: No gross musculoskeletal defects noted.  Normal range of motion.  No visible edema.  SKIN: Skin color, texture, turgor normal.   NEURO: mentation intact  PSYCH: Mentation appears normal, affect normal/bright, judgement and insight intact, normal speech and appearance well-groomed.      Diagnostic Test Results:  Labs reviewed in Epic  none           Assessment & Plan     Conchita was seen today for a.d.h.d.    Diagnoses and all orders for this visit:    Attention deficit disorder of adult  -     amphetamine-dextroamphetamine (ADDERALL) 30 MG tablet; Take 1 tablet (30 mg) by mouth 2 times daily  The current medical regimen is effective.  Continue current medication regimen unchanged.       See Patient Instructions  Patient Instructions     PLAN:   1.   Symptomatic therapy suggested: The current medical regimen is effective.  Continue current medication regimen unchanged.    2.  Orders Placed This Encounter   Medications     amphetamine-dextroamphetamine (ADDERALL) 30 MG tablet     Sig: Take 1 tablet (30 mg) by mouth 2 times daily     Dispense:  60 tablet     Refill:  0     3. Patient needs to follow up in if no improvement,or sooner if worsening of symptoms or other symptoms develop.  Follow up in 6 months.  Schedule physical exam         No " follow-ups on file.    MARIAA Richard CNP  [facility]      Video-Visit Details    Type of service:  Video Visit    Video End Time:3:22 PM    Originating Location (pt. Location): Home    Distant Location (provider location):  [facility]     Platform used for Video Visit: [employer]

## 2020-09-29 NOTE — PATIENT INSTRUCTIONS
PLAN:   1.   Symptomatic therapy suggested: The current medical regimen is effective.  Continue current medication regimen unchanged.    2.  Orders Placed This Encounter   Medications     amphetamine-dextroamphetamine (ADDERALL) 30 MG tablet     Sig: Take 1 tablet (30 mg) by mouth 2 times daily     Dispense:  60 tablet     Refill:  0     3. Patient needs to follow up in if no improvement,or sooner if worsening of symptoms or other symptoms develop.  Follow up in 6 months.  Schedule physical exam

## 2020-10-27 DIAGNOSIS — F98.8 ATTENTION DEFICIT DISORDER OF ADULT: ICD-10-CM

## 2020-10-27 RX ORDER — DEXTROAMPHETAMINE SACCHARATE, AMPHETAMINE ASPARTATE, DEXTROAMPHETAMINE SULFATE AND AMPHETAMINE SULFATE 7.5; 7.5; 7.5; 7.5 MG/1; MG/1; MG/1; MG/1
30 TABLET ORAL 2 TIMES DAILY
Qty: 60 TABLET | Refills: 0 | Status: SHIPPED | OUTPATIENT
Start: 2020-10-27 | End: 2021-07-05

## 2020-10-27 NOTE — TELEPHONE ENCOUNTER
AMPHETAMINE-DEXTROAMPHETAMI 30 TABS  : Take 1 tablet (30 mg) by mouth 2 times daily - Oral  Last Written Prescription Date:  9/29/20  Last Fill Quantity: 60,   # refills: 0  Last Office Visit : 9/29/20  Future Office visit:  6 months    Routing refill request to provider for review/approval because:  Controlled substance

## 2021-01-15 ENCOUNTER — HEALTH MAINTENANCE LETTER (OUTPATIENT)
Age: 36
End: 2021-01-15

## 2021-03-21 ENCOUNTER — HEALTH MAINTENANCE LETTER (OUTPATIENT)
Age: 36
End: 2021-03-21

## 2021-04-12 ENCOUNTER — TELEPHONE (OUTPATIENT)
Dept: FAMILY MEDICINE | Facility: CLINIC | Age: 36
End: 2021-04-12

## 2021-04-12 NOTE — TELEPHONE ENCOUNTER
Patient Quality Outreach      Summary:    Patient has the following on her problem list/HM:     Depression / Dysthymia review    6 Month Remission: 4-8 month window range:   12 Month Remission: 10-14 month window range:        PHQ-9 SCORE 2/22/2019 6/3/2019 4/7/2020   PHQ-9 Total Score - - -   PHQ-9 Total Score 13 0 10       If PHQ-9 recheck is 5 or more, route to provider for next steps.    Patient is due/failing the following:   PHQ-9 Needed    Type of outreach:    Sent [employer] message.    Questions for provider review:    None                                                                                                                                     Delaney Cifuentes

## 2021-05-24 ENCOUNTER — OFFICE VISIT (OUTPATIENT)
Dept: OBGYN | Facility: CLINIC | Age: 36
End: 2021-05-24
Payer: COMMERCIAL

## 2021-05-24 VITALS
SYSTOLIC BLOOD PRESSURE: 105 MMHG | HEIGHT: 69 IN | HEART RATE: 66 BPM | BODY MASS INDEX: 23.89 KG/M2 | WEIGHT: 161.3 LBS | DIASTOLIC BLOOD PRESSURE: 72 MMHG | OXYGEN SATURATION: 100 %

## 2021-05-24 DIAGNOSIS — Z01.419 WELL FEMALE EXAM WITH ROUTINE GYNECOLOGICAL EXAM: Primary | ICD-10-CM

## 2021-05-24 DIAGNOSIS — Z12.4 ENCOUNTER FOR SCREENING FOR CERVICAL CANCER: ICD-10-CM

## 2021-05-24 DIAGNOSIS — Z80.8 FAMILY HISTORY OF MELANOMA: ICD-10-CM

## 2021-05-24 DIAGNOSIS — F98.8 ATTENTION DEFICIT DISORDER OF ADULT: ICD-10-CM

## 2021-05-24 PROCEDURE — G0145 SCR C/V CYTO,THINLAYER,RESCR: HCPCS | Performed by: OBSTETRICS & GYNECOLOGY

## 2021-05-24 PROCEDURE — 99385 PREV VISIT NEW AGE 18-39: CPT | Performed by: OBSTETRICS & GYNECOLOGY

## 2021-05-24 PROCEDURE — 87624 HPV HI-RISK TYP POOLED RSLT: CPT | Performed by: OBSTETRICS & GYNECOLOGY

## 2021-05-24 ASSESSMENT — MIFFLIN-ST. JEOR: SCORE: 1489.99

## 2021-05-24 ASSESSMENT — PATIENT HEALTH QUESTIONNAIRE - PHQ9: SUM OF ALL RESPONSES TO PHQ QUESTIONS 1-9: 3

## 2021-05-24 NOTE — PROGRESS NOTES
SUBJECTIVE:   CC: Conchita Linares is an 35 year old woman who presents for preventive health visit.       Patient has been advised of split billing requirements and indicates understanding: Yes  Healthy Habits:    Getting at least 3 servings of Calcium per day:  NO    Bi-annual eye exam:  NO    Dental care twice a year:  Yes    Sleep apnea or symptoms of sleep apnea:  None    Diet:  Regular (no restrictions)    Frequency of exercise:  2-3 days/week    Duration of exercise:  30-45 minutes    Taking medications regularly:  Yes    Barriers to taking medications:  None    Medication side effects:  None    PHQ-2 Total Score:    Additional concerns today:  No       - No history of GDM or GHTN.  Also one step daughter at home - she is 11, will be 12 this year.   Contraception:  None.  Not sure about future pregnancy, open to it.  Breastfeeding.       Today's PHQ-2 Score:   PHQ-2 (  [employer]) 2020   Q1: Little interest or pleasure in doing things 0   Q2: Feeling down, depressed or hopeless 0   PHQ-2 Score 0       Abuse: Current or Past (Physical, Sexual or Emotional) - No  Do you feel safe in your environment? Yes        Social History     Tobacco Use     Smoking status: Never Smoker     Smokeless tobacco: Never Used   Substance Use Topics     Alcohol use: Yes     Comment: rarely     If you drink alcohol do you typically have >3 drinks per day or >7 drinks per week? No    No flowsheet data found.    BP Readings from Last 3 Encounters:   21 105/72   20 106/60   19 110/70    Wt Readings from Last 3 Encounters:   21 73.2 kg (161 lb 4.8 oz)   20 77.6 kg (171 lb)   19 61.2 kg (134 lb 14.4 oz)                  Patient Active Problem List   Diagnosis     Intermittent asthma     CARDIOVASCULAR SCREENING; LDL GOAL LESS THAN 160     Mild major depression (H)     Attention deficit disorder of adult     Past Surgical History:   Procedure Laterality Date     TONSILLECTOMY              Social History     Tobacco Use     Smoking status: Never Smoker     Smokeless tobacco: Never Used   Substance Use Topics     Alcohol use: Yes     Comment: rarely     Family History   Problem Relation Age of Onset     Hypertension Father      Cerebrovascular Disease Father      Congenital Anomalies Father      Depression Father      Cardiovascular Father      Osteoporosis Mother      Melanoma Mother      Cancer - colorectal Maternal Grandfather         diagnosed 60s     Hypertension Maternal Grandfather      Cancer - colorectal Paternal Grandmother         diagnosed 70s     Breast Cancer Paternal Grandmother         postmenopausal     Depression Maternal Uncle      Osteoporosis Maternal Grandmother      Asthma No family hx of      C.A.D. No family hx of      Diabetes No family hx of      Prostate Cancer No family hx of      Thyroid Disease No family hx of      Blood Disease No family hx of          Current Outpatient Medications   Medication Sig Dispense Refill     amphetamine-dextroamphetamine (ADDERALL) 30 MG tablet Take 1 tablet (30 mg) by mouth 2 times daily 60 tablet 0     Multiple Vitamin (MULTI-VITAMIN PO) Take  by mouth daily.       Allergies   Allergen Reactions     Sulfa Drugs      Rash      Amoxil [Amoxicillin] Rash     Recent Labs   Lab Test 06/27/19  0811 04/09/19  1033 02/22/19  1625 04/10/14  1011   LDL 45 72  --   --    HDL 54 68  --   --    TRIG 51 49  --   --    CR 0.65 0.67  --   --    GFRESTIMATED >90 >90  --   --    GFRESTBLACK >90 >90  --   --    POTASSIUM 3.9 4.2  --   --    TSH  --   --  1.51 1.40        Breast Cancer Screening:  Any new diagnosis of family breast, ovarian, or bowel cancer? No    Patient under 40 years of age: Routine Mammogram Screening not recommended.     History of abnormal Pap smear: NO - age 30-65 PAP every 5 years with negative HPV co-testing recommended  PAP / HPV Latest Ref Rng & Units 12/14/2017 4/10/2014 3/4/2013   PAP - NIL NIL ASC-US(A)   HPV 16 DNA  "NEG:Negative Negative - -   HPV 18 DNA NEG:Negative Negative - -   OTHER HR HPV NEG:Negative Negative - -       Review of Systems  CONSTITUTIONAL: NEGATIVE for fever, chills, change in weight  INTEGUMENTARU/SKIN: NEGATIVE for worrisome rashes, moles or lesions  EYES: NEGATIVE for vision changes or irritation  ENT: NEGATIVE for ear, mouth and throat problems  RESP: NEGATIVE for significant cough or SOB  BREAST: NEGATIVE for masses, tenderness or discharge  CV: NEGATIVE for chest pain, palpitations or peripheral edema  GI: NEGATIVE for nausea, abdominal pain, heartburn, or change in bowel habits  : NEGATIVE for unusual urinary or vaginal symptoms.s she has not had a period since she is breast feeding.  MUSCULOSKELETAL: NEGATIVE for significant arthralgias or myalgia  NEURO: NEGATIVE for weakness, dizziness or paresthesias  PSYCHIATRIC: NEGATIVE for changes in mood or affect     OBJECTIVE:   /72 (BP Location: Right arm, Cuff Size: Adult Regular)   Pulse 66   Ht 1.759 m (5' 9.25\")   Wt 73.2 kg (161 lb 4.8 oz)   LMP  (LMP Unknown)   SpO2 100%   BMI 23.65 kg/m    Physical Exam  Gen: Alert and oriented times 3, no acute distress.  Well developed, well nourished, pleasant.    Neck: Supple, no masses.  No thyromegaly.  Breast: Symmetrical without lesions.  No dimpling, nipple discharge, or discrete masses.  No lymphadenopathy.  Chest:  Non labored.  Clear to auscultation bilaterally.    Heart: Regular, normal S1, S2.  No murmurs.   Abdomen: Soft, nontender, nondistended.  No hepatosplenomegaly.    :  Normal female external genitalia.  No lesions.  Urethral meatus normal.  Speculum exam reveals a normal vaginal vault, normal cervix.  No abnormal discharge.  Bimanual exam reveals a normal, mobile, nontender uterus.  No cervical motion tenderness.  Adnexa nontender with no palpable masses.    Extremities:  Nontender, no edema.    Pap obtained:  Yes     ASSESSMENT/PLAN:       ICD-10-CM    1. Well female exam with " "routine gynecological exam  Z01.419    2. Family history of melanoma  Z80.8 DERMATOLOGY ADULT REFERRAL   3. Encounter for screening for cervical cancer   Z12.4 Pap imaged thin layer screen with HPV - recommended age 30 - 65 years (select HPV order below)     HPV High Risk Types DNA Cervical       Family history of melanoma - recommend skin checks with dermatology  Lipids in 2024, sooner as desired.  Asthma - exercise induced.  She will establish care with PCP if needed.  Stable now    Depression, stable, no meds.   ADHD - follow up with Gaye Branch for refills and management     Patient has been advised of split billing requirements and indicates understanding: Yes  COUNSELING:  Reviewed preventive health counseling, as reflected in patient instructions    Estimated body mass index is 23.65 kg/m  as calculated from the following:    Height as of this encounter: 1.759 m (5' 9.25\").    Weight as of this encounter: 73.2 kg (161 lb 4.8 oz).        She reports that she has never smoked. She has never used smokeless tobacco.      Counseling Resources:  ATP IV Guidelines  Pooled Cohorts Equation Calculator  Breast Cancer Risk Calculator  BRCA-Related Cancer Risk Assessment: FHS-7 Tool  FRAX Risk Assessment  ICSI Preventive Guidelines  Dietary Guidelines for Americans, 2010  "[employer]'s MyPlate  ASA Prophylaxis  Lung CA Screening    Kristi Toth MD  [facility] [place]  "

## 2021-05-25 RX ORDER — DEXTROAMPHETAMINE SACCHARATE, AMPHETAMINE ASPARTATE, DEXTROAMPHETAMINE SULFATE AND AMPHETAMINE SULFATE 7.5; 7.5; 7.5; 7.5 MG/1; MG/1; MG/1; MG/1
30 TABLET ORAL 2 TIMES DAILY
Qty: 60 TABLET | Refills: 0 | OUTPATIENT
Start: 2021-05-25

## 2021-05-25 NOTE — TELEPHONE ENCOUNTER
This writer attempted to contact pt on 05/25/21      Reason for call schedule visit and left message.  [employer] message sent to pt    If patient calls back:   Schedule Office Visit appointment within 1 month with primary care, document that pt called and close encounter         Annalisa Quan

## 2021-05-27 ENCOUNTER — VIRTUAL VISIT (OUTPATIENT)
Dept: FAMILY MEDICINE | Facility: CLINIC | Age: 36
End: 2021-05-27
Payer: COMMERCIAL

## 2021-05-27 DIAGNOSIS — F98.8 ATTENTION DEFICIT DISORDER OF ADULT: ICD-10-CM

## 2021-05-27 LAB
COPATH REPORT: NORMAL
PAP: NORMAL

## 2021-05-27 PROCEDURE — 99213 OFFICE O/P EST LOW 20 MIN: CPT | Mod: 95 | Performed by: NURSE PRACTITIONER

## 2021-05-27 RX ORDER — DEXTROAMPHETAMINE SACCHARATE, AMPHETAMINE ASPARTATE, DEXTROAMPHETAMINE SULFATE AND AMPHETAMINE SULFATE 7.5; 7.5; 7.5; 7.5 MG/1; MG/1; MG/1; MG/1
30 TABLET ORAL 2 TIMES DAILY
Qty: 60 TABLET | Refills: 0 | Status: CANCELLED | OUTPATIENT
Start: 2021-05-27

## 2021-05-27 NOTE — PROGRESS NOTES
"ERICH is a 35 year old who is being evaluated via a billable telephone visit.      What phone number would you like to be contacted at? [phone]  How would you like to obtain your AVS? Nieves Casper   ERICH is a 35 year old who presents for the following health issues     HPI     Medication Followup of Adderall    Taking Medication as prescribed: yes    Side Effects:  None    Medication Helping Symptoms:  yes   Conchita Linares is a 35 year old  female,  for a medication check and ADHD follow up.      MEDICATION BENEFITS:  Controlled symptoms:  Attention span, Distractability and Finishing tasks  Uncontrolled symptoms:  None     MEDICATION SIDE EFFECTS:   Has:  none  Denies:  appetite suppression, weight loss, insomnia, tics, palpitations, stomach ache, headache, emotional lability, rebound irritability, drowsiness, \"zombie\" effect, growth suppression and dry mouth    Has been off the medication for the last 6 months but wants to go back on   Had a baby last year July 6   Is breastfeeding at present and wants to go back on but is presently breastfeeding at least 5 to 6 times a day   She is struggling without the medication and her concentration   Has been on other medications and this works the best for her so did not really want to try a different medication   Labs reviewed in EPIC  BP Readings from Last 3 Encounters:   05/24/21 105/72   02/26/20 106/60   06/03/19 110/70    Wt Readings from Last 3 Encounters:   05/24/21 73.2 kg (161 lb 4.8 oz)   02/26/20 77.6 kg (171 lb)   06/03/19 61.2 kg (134 lb 14.4 oz)                  Patient Active Problem List   Diagnosis     Intermittent asthma     CARDIOVASCULAR SCREENING; LDL GOAL LESS THAN 160     Mild major depression (H)     Attention deficit disorder of adult     Past Surgical History:   Procedure Laterality Date     TONSILLECTOMY      1993       Social History     Tobacco Use     Smoking status: Never Smoker     Smokeless tobacco: Never Used   Substance " Use Topics     Alcohol use: Yes     Comment: rarely     Family History   Problem Relation Age of Onset     Hypertension Father      Cerebrovascular Disease Father      Congenital Anomalies Father      Depression Father      Cardiovascular Father      Osteoporosis Mother      Melanoma Mother      Cancer - colorectal Maternal Grandfather         diagnosed 60s     Hypertension Maternal Grandfather      Cancer - colorectal Paternal Grandmother         diagnosed 70s     Breast Cancer Paternal Grandmother         postmenopausal     Depression Maternal Uncle      Osteoporosis Maternal Grandmother      Asthma No family hx of      C.A.D. No family hx of      Diabetes No family hx of      Prostate Cancer No family hx of      Thyroid Disease No family hx of      Blood Disease No family hx of          Current Outpatient Medications   Medication Sig Dispense Refill     amphetamine-dextroamphetamine (ADDERALL) 30 MG tablet Take 1 tablet (30 mg) by mouth 2 times daily 60 tablet 0     Multiple Vitamin (MULTI-VITAMIN PO) Take  by mouth daily.       Allergies   Allergen Reactions     Sulfa Drugs      Rash      Amoxil [Amoxicillin] Rash             Review of Systems   Constitutional, HEENT, cardiovascular, pulmonary, gi and gu systems are negative, except as otherwise noted.      Objective           Vitals:  No vitals were obtained today due to virtual visit.  BP Readings from Last 6 Encounters:   05/24/21 105/72   02/26/20 106/60   06/03/19 110/70   02/22/19 111/60   06/22/18 100/55   12/14/17 100/60     Wt Readings from Last 4 Encounters:   05/24/21 73.2 kg (161 lb 4.8 oz)   02/26/20 77.6 kg (171 lb)   06/03/19 61.2 kg (134 lb 14.4 oz)   02/22/19 62 kg (136 lb 11.2 oz)       Physical Exam   alert, active and no distress  PSYCH: Alert and oriented times 3; coherent speech, normal   rate and volume, able to articulate logical thoughts, able   to abstract reason, no tangential thoughts, no hallucinations   or delusions  Her affect is  normal and pleasant  RESP: No cough, no audible wheezing, able to talk in full sentences  Remainder of exam unable to be completed due to telephone visits    Diagnostic Test Results:  Labs reviewed in Epic  none     Assessment & Plan     Attention deficit disorder of adult  Discussed with patient do not feel comfortable restarting her Adderal   However will consider based on opinion of her pediatrician as she is breastfeeding at least 5 to 6 times a day still        See Patient Instructions  Patient Instructions   Please check with your maite pediatrician related to breastfeeding and being on Adderal and send me a message. Based on that could consider restarting your Adderal       No follow-ups on file.    MARIAA Richard [facility]          Phone call duration: 12 minutes

## 2021-05-27 NOTE — PATIENT INSTRUCTIONS
Please check with your maite pediatrician related to breastfeeding and being on Adderal and send me a message. Based on that could consider restarting your Adderal

## 2021-06-01 LAB
FINAL DIAGNOSIS: NORMAL
HPV HR 12 DNA CVX QL NAA+PROBE: NEGATIVE
HPV16 DNA SPEC QL NAA+PROBE: NEGATIVE
HPV18 DNA SPEC QL NAA+PROBE: NEGATIVE
SPECIMEN DESCRIPTION: NORMAL
SPECIMEN SOURCE CVX/VAG CYTO: NORMAL

## 2021-07-05 ENCOUNTER — ANCILLARY PROCEDURE (OUTPATIENT)
Dept: GENERAL RADIOLOGY | Facility: CLINIC | Age: 36
End: 2021-07-05
Attending: FAMILY MEDICINE
Payer: COMMERCIAL

## 2021-07-05 ENCOUNTER — OFFICE VISIT (OUTPATIENT)
Dept: FAMILY MEDICINE | Facility: CLINIC | Age: 36
End: 2021-07-05
Payer: COMMERCIAL

## 2021-07-05 VITALS
DIASTOLIC BLOOD PRESSURE: 70 MMHG | WEIGHT: 161 LBS | HEART RATE: 98 BPM | TEMPERATURE: 97.8 F | BODY MASS INDEX: 23.6 KG/M2 | OXYGEN SATURATION: 100 % | SYSTOLIC BLOOD PRESSURE: 102 MMHG

## 2021-07-05 DIAGNOSIS — M79.675 PAIN OF TOE OF LEFT FOOT: ICD-10-CM

## 2021-07-05 DIAGNOSIS — S99.922A TOE INJURY, LEFT, INITIAL ENCOUNTER: Primary | ICD-10-CM

## 2021-07-05 DIAGNOSIS — S99.922A TOE INJURY, LEFT, INITIAL ENCOUNTER: ICD-10-CM

## 2021-07-05 PROCEDURE — 99213 OFFICE O/P EST LOW 20 MIN: CPT | Performed by: FAMILY MEDICINE

## 2021-07-05 PROCEDURE — 73660 X-RAY EXAM OF TOE(S): CPT | Mod: LT | Performed by: RADIOLOGY

## 2021-07-05 NOTE — PROGRESS NOTES
Assessment & Plan     Toe injury, left, initial encounter    X ray negative for acute bony abnormality, soft tissue injury. RICE  - XR Toe Left G/E 2 Views; Future    Pain of toe of left foot   tylenol as needed for pain, buddy taping will not help as has no fracture  - XR Toe Left G/E 2 Views; Future    Return in about 1 week (around 7/12/2021) for Follow up for symptoms recheck.    José Unger MD  [facility] CANDELARIO JUNG is a 35 year old who presents for the following health issues:      HPI     Chief Complaint   Patient presents with     Toe Pain     left 4th toe x 3 days ago-stubbed.     Bumped toe onto fireplace 3 days ago  Swelling of the toe, with redness on pain with movement    LMP:   Nursing and sure is not pregnant    {Review of Systems   Constitutional, HEENT, cardiovascular, pulmonary, gi and gu systems are negative, except as otherwise noted.      Objective    /70   Pulse 98   Temp 97.8  F (36.6  C) (Oral)   Wt 73 kg (161 lb)   SpO2 100%   BMI 23.60 kg/m    Body mass index is 23.6 kg/m .  Physical Exam   GENERAL: healthy, alert and no distress  RESP: lungs clear to auscultation - no rales, rhonchi or wheezes  CV: regular rate and rhythm,  no murmur, click or rub  MS: Left fourth toe, dorsal erythema , swelling and tenderness

## 2021-09-04 ENCOUNTER — HEALTH MAINTENANCE LETTER (OUTPATIENT)
Age: 36
End: 2021-09-04

## 2022-08-06 ENCOUNTER — HEALTH MAINTENANCE LETTER (OUTPATIENT)
Age: 37
End: 2022-08-06

## 2022-08-19 ENCOUNTER — TELEPHONE (OUTPATIENT)
Dept: FAMILY MEDICINE | Facility: CLINIC | Age: 37
End: 2022-08-19

## 2022-08-19 ENCOUNTER — VIRTUAL VISIT (OUTPATIENT)
Dept: FAMILY MEDICINE | Facility: CLINIC | Age: 37
End: 2022-08-19
Payer: COMMERCIAL

## 2022-08-19 DIAGNOSIS — F98.8 ATTENTION DEFICIT DISORDER OF ADULT: Primary | ICD-10-CM

## 2022-08-19 DIAGNOSIS — F32.0 MILD MAJOR DEPRESSION (H): ICD-10-CM

## 2022-08-19 PROCEDURE — 99213 OFFICE O/P EST LOW 20 MIN: CPT | Mod: 95 | Performed by: NURSE PRACTITIONER

## 2022-08-19 RX ORDER — DEXTROAMPHETAMINE SACCHARATE, AMPHETAMINE ASPARTATE, DEXTROAMPHETAMINE SULFATE AND AMPHETAMINE SULFATE 7.5; 7.5; 7.5; 7.5 MG/1; MG/1; MG/1; MG/1
30 TABLET ORAL 2 TIMES DAILY
Qty: 60 TABLET | Refills: 0 | Status: SHIPPED | OUTPATIENT
Start: 2022-08-19 | End: 2022-09-18

## 2022-08-19 RX ORDER — CHLORAL HYDRATE 500 MG
2 CAPSULE ORAL DAILY
COMMUNITY

## 2022-08-19 RX ORDER — DEXTROAMPHETAMINE SACCHARATE, AMPHETAMINE ASPARTATE, DEXTROAMPHETAMINE SULFATE AND AMPHETAMINE SULFATE 7.5; 7.5; 7.5; 7.5 MG/1; MG/1; MG/1; MG/1
30 TABLET ORAL 2 TIMES DAILY
Qty: 60 TABLET | Refills: 0 | Status: SHIPPED | OUTPATIENT
Start: 2022-10-20 | End: 2022-11-19

## 2022-08-19 RX ORDER — DEXTROAMPHETAMINE SACCHARATE, AMPHETAMINE ASPARTATE, DEXTROAMPHETAMINE SULFATE AND AMPHETAMINE SULFATE 7.5; 7.5; 7.5; 7.5 MG/1; MG/1; MG/1; MG/1
30 TABLET ORAL 2 TIMES DAILY
Qty: 60 TABLET | Refills: 0 | Status: SHIPPED | OUTPATIENT
Start: 2022-09-19 | End: 2022-10-19

## 2022-08-19 ASSESSMENT — PATIENT HEALTH QUESTIONNAIRE - PHQ9: SUM OF ALL RESPONSES TO PHQ QUESTIONS 1-9: 4

## 2022-08-19 ASSESSMENT — ASTHMA QUESTIONNAIRES
ACT_TOTALSCORE: 25
QUESTION_2 LAST FOUR WEEKS HOW OFTEN HAVE YOU HAD SHORTNESS OF BREATH: NOT AT ALL
QUESTION_4 LAST FOUR WEEKS HOW OFTEN HAVE YOU USED YOUR RESCUE INHALER OR NEBULIZER MEDICATION (SUCH AS ALBUTEROL): NOT AT ALL
QUESTION_1 LAST FOUR WEEKS HOW MUCH OF THE TIME DID YOUR ASTHMA KEEP YOU FROM GETTING AS MUCH DONE AT WORK, SCHOOL OR AT HOME: NONE OF THE TIME
QUESTION_5 LAST FOUR WEEKS HOW WOULD YOU RATE YOUR ASTHMA CONTROL: COMPLETELY CONTROLLED
ACT_TOTALSCORE: 25
QUESTION_3 LAST FOUR WEEKS HOW OFTEN DID YOUR ASTHMA SYMPTOMS (WHEEZING, COUGHING, SHORTNESS OF BREATH, CHEST TIGHTNESS OR PAIN) WAKE YOU UP AT NIGHT OR EARLIER THAN USUAL IN THE MORNING: NOT AT ALL

## 2022-08-19 NOTE — TELEPHONE ENCOUNTER
Reason for call:  Other   Patient called regarding (reason for call): call back  Additional comments: Patient called back and stated that they are not able to come in office today, 8/19/22, for the appointment at 2 pm.    Phone number to reach patient:  Home number on file [phone] (home)    Best Time:  Any    Can we leave a detailed message on this number?  YES    Travel screening: Not Applicable

## 2022-08-19 NOTE — PROGRESS NOTES
ERICH is a 36 year old who is being evaluated via a billable video visit.      How would you like to obtain your AVS? MyChart  If the video visit is dropped, the invitation should be resent by: Text to cell phone: [phone]  Will anyone else be joining your video visit? No              Subjective   ERICH is a 36 year old, presenting for the following health issues:  DOLORES    -pt was on adderall prior to having her child/breast feeding and she would like to discuss getting back on this medication      A.CODIE    History of Present Illness       Reason for visit:  To get back on my medication    She eats 2-3 servings of fruits and vegetables daily.She consumes 1 sweetened beverage(s) daily.She exercises with enough effort to increase her heart rate 9 or less minutes per day.  She exercises with enough effort to increase her heart rate 3 or less days per week.   She is taking medications regularly.     Did well on medications prior and is ready to go back on as is no longer breast feeding   Her baby in now 3 yo   Goes to [facility] and will get a physical there but not sure if they prescribe for ADHD  Would like to go back onto her Adderal   Labs reviewed in EPIC  BP Readings from Last 3 Encounters:   07/05/21 102/70   05/24/21 105/72   02/26/20 106/60    Wt Readings from Last 3 Encounters:   07/05/21 73 kg (161 lb)   05/24/21 73.2 kg (161 lb 4.8 oz)   02/26/20 77.6 kg (171 lb)               Patient Active Problem List   Diagnosis     Intermittent asthma     CARDIOVASCULAR SCREENING; LDL GOAL LESS THAN 160     Mild major depression (H)     Attention deficit disorder of adult     Past Surgical History:   Procedure Laterality Date     TONSILLECTOMY      1993       Social History     Tobacco Use     Smoking status: Never Smoker     Smokeless tobacco: Never Used   Substance Use Topics     Alcohol use: Yes     Comment: rarely     Family History   Problem Relation Age of Onset     Hypertension Father       Cerebrovascular Disease Father      Congenital Anomalies Father      Depression Father      Cardiovascular Father      Osteoporosis Mother      Melanoma Mother      Cancer - colorectal Maternal Grandfather         diagnosed 60s     Hypertension Maternal Grandfather      Cancer - colorectal Paternal Grandmother         diagnosed 70s     Breast Cancer Paternal Grandmother         postmenopausal     Depression Maternal Uncle      Osteoporosis Maternal Grandmother      Asthma No family hx of      C.A.D. No family hx of      Diabetes No family hx of      Prostate Cancer No family hx of      Thyroid Disease No family hx of      Blood Disease No family hx of          Current Outpatient Medications   Medication Sig Dispense Refill     fish oil-omega-3 fatty acids 1000 MG capsule Take 2 g by mouth daily       Multiple Vitamin (MULTI-VITAMIN PO) Take  by mouth daily.       Allergies   Allergen Reactions     Sulfa Drugs      Rash      Amoxil [Amoxicillin] Rash         Review of Systems   Constitutional, HEENT, cardiovascular, pulmonary, gi and gu systems are negative, except as otherwise noted.      Objective           Vitals:  No vitals were obtained today due to virtual visit.    Physical Exam   GENERAL: Healthy, alert and no distress  EYES: Eyes grossly normal to inspection and conjunctivae and sclerae normal  HENT: normal cephalic/atraumatic and oral mucous membranes moist  RESP: No audible wheeze, cough, or visible cyanosis.  No visible retractions or increased work of breathing.    MS: No gross musculoskeletal defects noted.  Normal range of motion.  No visible edema.  SKIN: Visible skin clear. No significant rash, abnormal pigmentation or lesions.  NEURO: Cranial nerves grossly intact.  Mentation and speech appropriate for age.  PSYCH: Mentation appears normal, affect normal/bright, judgement and insight intact, normal speech and appearance well-groomed.      Assessment & Plan     Attention deficit disorder of adult  -  amphetamine-dextroamphetamine (ADDERALL) 30 MG tablet  Dispense: 60 tablet; Refill: 0  - amphetamine-dextroamphetamine (ADDERALL) 30 MG tablet  Dispense: 60 tablet; Refill: 0  - amphetamine-dextroamphetamine (ADDERALL) 30 MG tablet  Dispense: 60 tablet; Refill: 0      Prescription drug management   Time spent doing chart review, history and exam, documentation and further activities per the note       See Patient Instructions  Patient Instructions     PLAN:   1.   Symptomatic therapy suggested: will restart Adderal   2.  Orders Placed This Encounter   Medications     fish oil-omega-3 fatty acids 1000 MG capsule     Sig: Take 2 g by mouth daily     amphetamine-dextroamphetamine (ADDERALL) 30 MG tablet     Sig: Take 1 tablet (30 mg) by mouth 2 times daily for 30 days     Dispense:  60 tablet     Refill:  0     amphetamine-dextroamphetamine (ADDERALL) 30 MG tablet     Sig: Take 1 tablet (30 mg) by mouth 2 times daily for 30 days     Dispense:  60 tablet     Refill:  0     amphetamine-dextroamphetamine (ADDERALL) 30 MG tablet     Sig: Take 1 tablet (30 mg) by mouth 2 times daily for 30 days     Dispense:  60 tablet     Refill:  0     Orders Placed This Encounter   Procedures     REVIEW OF HEALTH MAINTENANCE PROTOCOL ORDERS       3. Patient needs to follow up in if no improvement,or sooner if worsening of symptoms or other symptoms develop.  Need in person follow up appointment within the next 3 months           Return in about 3 months (around 11/19/2022), or if symptoms worsen or fail to improve.    MARIAA Richard CNP  [facility]          Video-Visit Details    Video Start Time: 2:15 PM    Type of service:  Video Visit    Video End Time:2:30 PM    Originating Location (pt. Location): Home    Distant Location (provider location):  [facility]     Platform used for Video Visit: Sourav    .  ..

## 2022-08-19 NOTE — PATIENT INSTRUCTIONS
PLAN:   1.   Symptomatic therapy suggested: will restart Adderal   2.  Orders Placed This Encounter   Medications    fish oil-omega-3 fatty acids 1000 MG capsule     Sig: Take 2 g by mouth daily    amphetamine-dextroamphetamine (ADDERALL) 30 MG tablet     Sig: Take 1 tablet (30 mg) by mouth 2 times daily for 30 days     Dispense:  60 tablet     Refill:  0    amphetamine-dextroamphetamine (ADDERALL) 30 MG tablet     Sig: Take 1 tablet (30 mg) by mouth 2 times daily for 30 days     Dispense:  60 tablet     Refill:  0    amphetamine-dextroamphetamine (ADDERALL) 30 MG tablet     Sig: Take 1 tablet (30 mg) by mouth 2 times daily for 30 days     Dispense:  60 tablet     Refill:  0     Orders Placed This Encounter   Procedures    REVIEW OF HEALTH MAINTENANCE PROTOCOL ORDERS       3. Patient needs to follow up in if no improvement,or sooner if worsening of symptoms or other symptoms develop.  Need in person follow up appointment within the next 3 months

## 2022-08-19 NOTE — Clinical Note
Needs in person follow up appointment within the next 3 months  OK to use same day, NP or Hospital follow up appointment  Thanks

## 2022-08-19 NOTE — TELEPHONE ENCOUNTER
Called pt and LVM. Kimmy Branch is wondering if she can come in for her appt today at 2 pm instead of doing a video visit. If pt calls back and she can come in, please change her appt to in person instead of video. Thanks!      Reason for Call:  Appointment Request    Provider requesting this type of appt:  In person instead of video visit    Requested provider: Gaye Branch    Reason patient unable to be scheduled: n/a    When does patient want to be seen/preferred time: n/a    Comments: see above, change to in person if pt can come in instead of doing video visit    Could we send this information to you in eyeOS or would you prefer to receive a phone call?:   n/a    Call taken on 8/19/2022 at 8:24 AM by Kyung Welsh

## 2022-10-22 ENCOUNTER — HEALTH MAINTENANCE LETTER (OUTPATIENT)
Age: 37
End: 2022-10-22

## 2023-03-17 ENCOUNTER — TRANSFERRED RECORDS (OUTPATIENT)
Dept: HEALTH INFORMATION MANAGEMENT | Facility: CLINIC | Age: 38
End: 2023-03-17

## 2023-06-26 ENCOUNTER — OFFICE VISIT (OUTPATIENT)
Dept: FAMILY MEDICINE | Facility: CLINIC | Age: 38
End: 2023-06-26
Payer: COMMERCIAL

## 2023-06-26 VITALS
OXYGEN SATURATION: 99 % | TEMPERATURE: 98 F | BODY MASS INDEX: 22.59 KG/M2 | HEIGHT: 70 IN | HEART RATE: 59 BPM | WEIGHT: 157.8 LBS | SYSTOLIC BLOOD PRESSURE: 104 MMHG | RESPIRATION RATE: 11 BRPM | DIASTOLIC BLOOD PRESSURE: 69 MMHG

## 2023-06-26 DIAGNOSIS — Z00.00 ROUTINE GENERAL MEDICAL EXAMINATION AT A HEALTH CARE FACILITY: Primary | ICD-10-CM

## 2023-06-26 DIAGNOSIS — F33.8 SEASONAL AFFECTIVE DISORDER (H): ICD-10-CM

## 2023-06-26 DIAGNOSIS — F98.8 ATTENTION DEFICIT DISORDER OF ADULT: ICD-10-CM

## 2023-06-26 PROBLEM — F32.0 MILD MAJOR DEPRESSION (H): Status: RESOLVED | Noted: 2022-08-19 | Resolved: 2023-06-26

## 2023-06-26 PROCEDURE — 99213 OFFICE O/P EST LOW 20 MIN: CPT | Mod: 25 | Performed by: NURSE PRACTITIONER

## 2023-06-26 PROCEDURE — 99395 PREV VISIT EST AGE 18-39: CPT | Performed by: NURSE PRACTITIONER

## 2023-06-26 RX ORDER — DEXTROAMPHETAMINE SACCHARATE, AMPHETAMINE ASPARTATE, DEXTROAMPHETAMINE SULFATE AND AMPHETAMINE SULFATE 7.5; 7.5; 7.5; 7.5 MG/1; MG/1; MG/1; MG/1
30 TABLET ORAL 2 TIMES DAILY
Qty: 60 TABLET | Refills: 0 | Status: SHIPPED | OUTPATIENT
Start: 2023-07-27 | End: 2023-08-26

## 2023-06-26 RX ORDER — DEXTROAMPHETAMINE SACCHARATE, AMPHETAMINE ASPARTATE, DEXTROAMPHETAMINE SULFATE AND AMPHETAMINE SULFATE 7.5; 7.5; 7.5; 7.5 MG/1; MG/1; MG/1; MG/1
30 TABLET ORAL 2 TIMES DAILY
Qty: 60 TABLET | Refills: 0 | Status: SHIPPED | OUTPATIENT
Start: 2023-08-27 | End: 2023-09-26

## 2023-06-26 RX ORDER — DEXTROAMPHETAMINE SACCHARATE, AMPHETAMINE ASPARTATE, DEXTROAMPHETAMINE SULFATE AND AMPHETAMINE SULFATE 7.5; 7.5; 7.5; 7.5 MG/1; MG/1; MG/1; MG/1
30 TABLET ORAL 2 TIMES DAILY
Qty: 60 TABLET | Refills: 0 | Status: SHIPPED | OUTPATIENT
Start: 2023-06-26 | End: 2023-07-26

## 2023-06-26 ASSESSMENT — ENCOUNTER SYMPTOMS
NERVOUS/ANXIOUS: 0
HEMATOCHEZIA: 0
DIZZINESS: 0
FREQUENCY: 0
DIARRHEA: 0
JOINT SWELLING: 0
SORE THROAT: 0
EYE PAIN: 0
PALPITATIONS: 0
BREAST MASS: 0
HEADACHES: 0
CONSTIPATION: 0
HEARTBURN: 0
ARTHRALGIAS: 0
DYSURIA: 0
WEAKNESS: 0
FEVER: 0
PARESTHESIAS: 0
HEMATURIA: 0
ABDOMINAL PAIN: 0
COUGH: 0
CHILLS: 0
SHORTNESS OF BREATH: 0
MYALGIAS: 0
NAUSEA: 0

## 2023-06-26 ASSESSMENT — ASTHMA QUESTIONNAIRES: ACT_TOTALSCORE: 25

## 2023-06-26 ASSESSMENT — PATIENT HEALTH QUESTIONNAIRE - PHQ9
SUM OF ALL RESPONSES TO PHQ QUESTIONS 1-9: 3
SUM OF ALL RESPONSES TO PHQ QUESTIONS 1-9: 3
10. IF YOU CHECKED OFF ANY PROBLEMS, HOW DIFFICULT HAVE THESE PROBLEMS MADE IT FOR YOU TO DO YOUR WORK, TAKE CARE OF THINGS AT HOME, OR GET ALONG WITH OTHER PEOPLE: SOMEWHAT DIFFICULT

## 2023-06-26 NOTE — PATIENT INSTRUCTIONS
PLAN:   1.   Symptomatic therapy suggested: Increase calcium to 1000mg and 1000iu Vit D  2.  Orders Placed This Encounter   Medications    amphetamine-dextroamphetamine (ADDERALL) 30 MG tablet     Sig: Take 1 tablet (30 mg) by mouth 2 times daily for 30 days     Dispense:  60 tablet     Refill:  0    amphetamine-dextroamphetamine (ADDERALL) 30 MG tablet     Sig: Take 1 tablet (30 mg) by mouth 2 times daily for 30 days     Dispense:  60 tablet     Refill:  0    amphetamine-dextroamphetamine (ADDERALL) 30 MG tablet     Sig: Take 1 tablet (30 mg) by mouth 2 times daily for 30 days     Dispense:  60 tablet     Refill:  0     Orders Placed This Encounter   Procedures    REVIEW OF HEALTH MAINTENANCE PROTOCOL ORDERS       3. Patient needs to follow up in if no improvement,or sooner if worsening of symptoms or other symptoms develop.  Follow up in 6 months.  Follow up office visit in one year for annual health maintenance exam, sooner PRN.    Preventive Health Recommendations  Female Ages 26 - 39  Yearly exam:   See your health care provider every year in order to  Review health changes.   Discuss preventive care.    Review your medicines if you your doctor has prescribed any.    Until age 30: Get a Pap test every three years (more often if you have had an abnormal result).    After age 30: Talk to your doctor about whether you should have a Pap test every 3 years or have a Pap test with HPV screening every 5 years.   You do not need a Pap test if your uterus was removed (hysterectomy) and you have not had cancer.  You should be tested each year for STDs (sexually transmitted diseases), if you're at risk.   Talk to your provider about how often to have your cholesterol checked.  If you are at risk for diabetes, you should have a diabetes test (fasting glucose).  Shots: Get a flu shot each year. Get a tetanus shot every 10 years.   Nutrition:   Eat at least 5 servings of fruits and vegetables each day.  Eat whole-grain  bread, whole-wheat pasta and brown rice instead of white grains and rice.  Get adequate Calcium and Vitamin D.     Lifestyle  Exercise at least 150 minutes a week (30 minutes a day, 5 days of the week). This will help you control your weight and prevent disease.  Limit alcohol to one drink per day.  No smoking.   Wear sunscreen to prevent skin cancer.  See your dentist every six months for an exam and cleaning.

## 2023-06-26 NOTE — PROGRESS NOTES
SUBJECTIVE:   CC: ERICH is an 37 year old who presents for preventive health visit.       6/26/2023     8:56 AM   Additional Questions   Roomed by Bessie         6/26/2023     8:56 AM   Patient Reported Additional Medications   Patient reports taking the following new medications None     Healthy Habits:     Getting at least 3 servings of Calcium per day:  NO    Bi-annual eye exam:  NO    Dental care twice a year:  Yes    Sleep apnea or symptoms of sleep apnea:  None    Diet:  Regular (no restrictions)    Frequency of exercise:  2-3 days/week    Duration of exercise:  30-45 minutes    Taking medications regularly:  Yes    Medication side effects:  None    PHQ-2 Total Score: 0    Additional concerns today:  No      PROBLEMS TO ADD ON...  Would like to start back on Adderal   Was off for pregnancy     Have you ever done Advance Care Planning? (For example, a Health Directive, POLST, or a discussion with a medical provider or your loved ones about your wishes): No, advance care planning information given to patient to review.  Patient plans to discuss their wishes with loved ones or provider.      Social History     Tobacco Use     Smoking status: Never     Smokeless tobacco: Never   Substance Use Topics     Alcohol use: Yes     Comment: rarely             6/26/2023     8:48 AM   Alcohol Use   Prescreen: >3 drinks/day or >7 drinks/week? No     Reviewed orders with patient.  Reviewed health maintenance and updated orders accordingly - Yes  Lab work is in process  Labs reviewed in EPIC  BP Readings from Last 3 Encounters:   06/26/23 104/69   07/05/21 102/70   05/24/21 105/72    Wt Readings from Last 3 Encounters:   06/26/23 71.6 kg (157 lb 12.8 oz)   07/05/21 73 kg (161 lb)   05/24/21 73.2 kg (161 lb 4.8 oz)                  Patient Active Problem List   Diagnosis     Intermittent asthma     CARDIOVASCULAR SCREENING; LDL GOAL LESS THAN 160     Mild major depression (H)     Attention deficit disorder of adult     Past  Surgical History:   Procedure Laterality Date     TONSILLECTOMY      1993       Social History     Tobacco Use     Smoking status: Never     Smokeless tobacco: Never   Substance Use Topics     Alcohol use: Yes     Comment: rarely     Family History   Problem Relation Age of Onset     Hypertension Father      Cerebrovascular Disease Father      Congenital Anomalies Father      Depression Father      Cardiovascular Father      Osteoporosis Mother      Melanoma Mother      Cancer - colorectal Maternal Grandfather         diagnosed 60s     Hypertension Maternal Grandfather      Cancer - colorectal Paternal Grandmother         diagnosed 70s     Breast Cancer Paternal Grandmother         postmenopausal     Depression Maternal Uncle      Osteoporosis Maternal Grandmother      Asthma No family hx of      C.A.D. No family hx of      Diabetes No family hx of      Prostate Cancer No family hx of      Thyroid Disease No family hx of      Blood Disease No family hx of          Current Outpatient Medications   Medication Sig Dispense Refill     amphetamine-dextroamphetamine (ADDERALL) 30 MG tablet Take 1 tablet (30 mg) by mouth 2 times daily for 30 days 60 tablet 0     [START ON 7/27/2023] amphetamine-dextroamphetamine (ADDERALL) 30 MG tablet Take 1 tablet (30 mg) by mouth 2 times daily for 30 days 60 tablet 0     [START ON 8/27/2023] amphetamine-dextroamphetamine (ADDERALL) 30 MG tablet Take 1 tablet (30 mg) by mouth 2 times daily for 30 days 60 tablet 0     fish oil-omega-3 fatty acids 1000 MG capsule Take 2 g by mouth daily       Multiple Vitamin (MULTI-VITAMIN PO) Take  by mouth daily.       Allergies   Allergen Reactions     Sulfa Antibiotics      Rash      Amoxil [Amoxicillin] Rash       Breast Cancer Screening:    FHS-7:       6/26/2023     8:49 AM   Breast CA Risk Assessment (FHS-7)   Did any of your first-degree relatives have breast or ovarian cancer? No   Did any of your relatives have bilateral breast cancer? Yes    Did any man in your family have breast cancer? No   Did any woman in your family have breast and ovarian cancer? Yes   Did any woman in your family have breast cancer before age 50 y? No   Do you have 2 or more relatives with breast and/or ovarian cancer? No   Do you have 2 or more relatives with breast and/or bowel cancer? No       Patient under 40 years of age: Routine Mammogram Screening not recommended.   Pertinent mammograms are reviewed under the imaging tab.    History of abnormal Pap smear: NO - age 30-65 PAP every 5 years with negative HPV co-testing recommended      Latest Ref Rng & Units 5/24/2021     8:40 AM 5/24/2021     8:36 AM 12/14/2017     3:00 PM   PAP / HPV   PAP (Historical)   NIL     HPV 16 DNA NEG^Negative Negative   Negative    HPV 18 DNA NEG^Negative Negative   Negative    Other HR HPV NEG^Negative Negative   Negative      Reviewed and updated as needed this visit by clinical staff   Tobacco  Allergies  Meds              Reviewed and updated as needed this visit by Provider                 Past Medical History:   Diagnosis Date     ADD (attention deficit disorder)      Attention deficit disorder of adult 3/18/2013     Depression      Depressive disorder 2001     Encounter for insertion of mirena IUD 09/2016     Intermittent asthma 10/10/2012     Mild major depression (H) 10/15/2012    Seeing [facility] for ADHD and ADD testing 12/2012. - see release of information. ARIADNE Parks       Past Surgical History:   Procedure Laterality Date     TONSILLECTOMY      1993       Review of Systems   Constitutional: Negative for chills and fever.   HENT: Negative for congestion, ear pain, hearing loss and sore throat.    Eyes: Negative for pain and visual disturbance.   Respiratory: Negative for cough and shortness of breath.    Cardiovascular: Negative for chest pain, palpitations and peripheral edema.   Gastrointestinal: Negative for abdominal pain, constipation, diarrhea, heartburn,  "hematochezia and nausea.   Breasts:  Negative for tenderness, breast mass and discharge.   Genitourinary: Negative for dysuria, frequency, genital sores, hematuria, pelvic pain, urgency, vaginal bleeding and vaginal discharge.   Musculoskeletal: Negative for arthralgias, joint swelling and myalgias.   Skin: Negative for rash.   Neurological: Negative for dizziness, weakness, headaches and paresthesias.   Psychiatric/Behavioral: Negative for mood changes. The patient is not nervous/anxious.         Has hx of depression but mostly in the winter and has had wellbutrin in the winter  Needing to go back on Adderal now that she has delivered and is not breast feeding        OBJECTIVE:   /69 (BP Location: Right arm, Patient Position: Sitting, Cuff Size: Adult Large)   Pulse 59   Temp 98  F (36.7  C) (Oral)   Resp 11   Ht 1.773 m (5' 9.8\")   Wt 71.6 kg (157 lb 12.8 oz)   SpO2 99%   Breastfeeding No   BMI 22.77 kg/m    Physical Exam  GENERAL: healthy, alert and no distress  EYES: Eyes grossly normal to inspection and conjunctivae and sclerae normal  HENT: ear canals and TM's normal, nose and mouth without ulcers or lesions  NECK: no adenopathy, no asymmetry, masses, or scars and thyroid normal to palpation  RESP: lungs clear to auscultation - no rales, rhonchi or wheezes  BREAST: normal without masses, tenderness or nipple discharge and no palpable axillary masses or adenopathy  CV: regular rates and rhythm, no murmur, click or rub, peripheral pulses strong and no peripheral edema  ABDOMEN: soft, nontender, no hepatosplenomegaly, no masses and bowel sounds normal   (female): normal female external genitalia, normal urethral meatus, vaginal mucosa pink, moist, well rugated, and normal cervix/adnexa/uterus without masses or discharge  MS: no gross musculoskeletal defects noted, no edema  SKIN: no suspicious lesions or rashes  NEURO: Normal strength and tone, mentation intact and speech normal  PSYCH: mentation " appears normal, affect normal/bright  LYMPH: no cervical, supraclavicular, axillary, or inguinal adenopathy    Diagnostic Test Results:  Labs reviewed in Epic      ASSESSMENT/PLAN:   Conchita was seen today for physical.    Diagnoses and all orders for this visit:    Routine general medical examination at a health care facility  -     REVIEW OF HEALTH MAINTENANCE PROTOCOL ORDERS    Seasonal affective disorder (H)  Stable at this time on no medication    Attention deficit disorder of adult  Will Start:  -     amphetamine-dextroamphetamine (ADDERALL) 30 MG tablet; Take 1 tablet (30 mg) by mouth 2 times daily for 30 days  -     amphetamine-dextroamphetamine (ADDERALL) 30 MG tablet; Take 1 tablet (30 mg) by mouth 2 times daily for 30 days  -     amphetamine-dextroamphetamine (ADDERALL) 30 MG tablet; Take 1 tablet (30 mg) by mouth 2 times daily for 30 days  Follow up 6 months     PLAN:   Patient needs to follow up in if no improvement,or sooner if worsening of symptoms or other symptoms develop.  Follow up in 6 months.  Follow up office visit in one year for annual health maintenance exam, sooner PRN.  Patient has been advised of split billing requirements and indicates understanding: Yes      COUNSELING:  Reviewed preventive health counseling, as reflected in patient instructions  Special attention given to:        Regular exercise       Healthy diet/nutrition       Contraception       Family planning        She reports that she has never smoked. She has never used smokeless tobacco.           MARIAA [facility]  Answers for HPI/ROS submitted by the patient on 6/26/2023  If you checked off any problems, how difficult have these problems made it for you to do your work, take care of things at home, or get along with other people?: Somewhat difficult  PHQ9 TOTAL SCORE: 3

## 2024-04-11 ENCOUNTER — TELEPHONE (OUTPATIENT)
Dept: FAMILY MEDICINE | Facility: CLINIC | Age: 39
End: 2024-04-11
Payer: COMMERCIAL

## 2024-04-11 NOTE — TELEPHONE ENCOUNTER
Patient Quality Outreach    Patient is due for the following:   Asthma  -  ACT needed and C-ACT needed  Depression  -  PHQ-9 needed      Topic Date Due    Pneumococcal Vaccine (1 of 2 - PCV) Never done    Diptheria Tetanus Pertussis (DTAP/TDAP/TD) Vaccine (3 - Td or Tdap) 09/08/2016    Flu Vaccine (1) 09/01/2023    COVID-19 Vaccine (1 - 2023-24 season) Never done       Next Steps:   Patient was assigned appropriate questionnaire to complete    Type of outreach:    Sent [employer] message.      Questions for provider review:    None           Diane L. Schoenherr, RN

## 2024-12-22 ENCOUNTER — HEALTH MAINTENANCE LETTER (OUTPATIENT)
Age: 39
End: 2024-12-22

## 2025-08-04 ENCOUNTER — PATIENT OUTREACH (OUTPATIENT)
Dept: CARE COORDINATION | Facility: CLINIC | Age: 40
End: 2025-08-04
Payer: COMMERCIAL